# Patient Record
Sex: FEMALE | Race: WHITE
[De-identification: names, ages, dates, MRNs, and addresses within clinical notes are randomized per-mention and may not be internally consistent; named-entity substitution may affect disease eponyms.]

---

## 2019-02-09 ENCOUNTER — HOSPITAL ENCOUNTER (EMERGENCY)
Dept: HOSPITAL 75 - ER | Age: 79
Discharge: HOME | End: 2019-02-09
Payer: MEDICARE

## 2019-02-09 VITALS — WEIGHT: 143 LBS | HEIGHT: 62 IN | BODY MASS INDEX: 26.31 KG/M2

## 2019-02-09 VITALS — SYSTOLIC BLOOD PRESSURE: 161 MMHG | DIASTOLIC BLOOD PRESSURE: 78 MMHG

## 2019-02-09 DIAGNOSIS — I10: ICD-10-CM

## 2019-02-09 DIAGNOSIS — Z98.51: ICD-10-CM

## 2019-02-09 DIAGNOSIS — Z87.19: ICD-10-CM

## 2019-02-09 DIAGNOSIS — G45.9: Primary | ICD-10-CM

## 2019-02-09 LAB
ALBUMIN SERPL-MCNC: 4.2 GM/DL (ref 3.2–4.5)
ALP SERPL-CCNC: 72 U/L (ref 40–136)
ALT SERPL-CCNC: 25 U/L (ref 0–55)
APTT BLD: 26 SEC (ref 24–35)
APTT PPP: YELLOW S
BACTERIA #/AREA URNS HPF: NEGATIVE /HPF
BASOPHILS # BLD AUTO: 0 10^3/UL (ref 0–0.1)
BASOPHILS NFR BLD AUTO: 0 % (ref 0–10)
BILIRUB SERPL-MCNC: 0.3 MG/DL (ref 0.1–1)
BILIRUB UR QL STRIP: NEGATIVE
BUN/CREAT SERPL: 17
CALCIUM SERPL-MCNC: 9.5 MG/DL (ref 8.5–10.1)
CHLORIDE SERPL-SCNC: 108 MMOL/L (ref 98–107)
CO2 SERPL-SCNC: 24 MMOL/L (ref 21–32)
CREAT SERPL-MCNC: 1.02 MG/DL (ref 0.6–1.3)
D DIMER PPP FEU-MCNC: 0.79 UG/ML (ref 0–0.49)
EOSINOPHIL # BLD AUTO: 0.1 10^3/UL (ref 0–0.3)
EOSINOPHIL NFR BLD AUTO: 1 % (ref 0–10)
ERYTHROCYTE [DISTWIDTH] IN BLOOD BY AUTOMATED COUNT: 13.6 % (ref 10–14.5)
FIBRINOGEN PPP-MCNC: CLEAR MG/DL
GFR SERPLBLD BASED ON 1.73 SQ M-ARVRAT: 52 ML/MIN
GLUCOSE SERPL-MCNC: 111 MG/DL (ref 70–105)
GLUCOSE UR STRIP-MCNC: NEGATIVE MG/DL
HCT VFR BLD CALC: 43 % (ref 35–52)
HGB BLD-MCNC: 14.1 G/DL (ref 11.5–16)
INR PPP: 1 (ref 0.8–1.4)
KETONES UR QL STRIP: NEGATIVE
LEUKOCYTE ESTERASE UR QL STRIP: (no result)
LYMPHOCYTES # BLD AUTO: 1 X 10^3 (ref 1–4)
LYMPHOCYTES NFR BLD AUTO: 14 % (ref 12–44)
MANUAL DIFFERENTIAL PERFORMED BLD QL: NO
MCH RBC QN AUTO: 31 PG (ref 25–34)
MCHC RBC AUTO-ENTMCNC: 33 G/DL (ref 32–36)
MCV RBC AUTO: 93 FL (ref 80–99)
MONOCYTES # BLD AUTO: 0.6 X 10^3 (ref 0–1)
MONOCYTES NFR BLD AUTO: 9 % (ref 0–12)
NEUTROPHILS # BLD AUTO: 5.2 X 10^3 (ref 1.8–7.8)
NEUTROPHILS NFR BLD AUTO: 75 % (ref 42–75)
NITRITE UR QL STRIP: NEGATIVE
PH UR STRIP: 5 [PH] (ref 5–9)
PLATELET # BLD: 181 10^3/UL (ref 130–400)
PMV BLD AUTO: 10.7 FL (ref 7.4–10.4)
POTASSIUM SERPL-SCNC: 3.9 MMOL/L (ref 3.6–5)
PROT SERPL-MCNC: 6.4 GM/DL (ref 6.4–8.2)
PROT UR QL STRIP: NEGATIVE
PROTHROMBIN TIME: 13 SEC (ref 12.2–14.7)
RBC #/AREA URNS HPF: (no result) /HPF
SODIUM SERPL-SCNC: 143 MMOL/L (ref 135–145)
SP GR UR STRIP: 1.01 (ref 1.02–1.02)
SQUAMOUS #/AREA URNS HPF: (no result) /HPF
UROBILINOGEN UR-MCNC: NORMAL MG/DL
WBC # BLD AUTO: 7 10^3/UL (ref 4.3–11)
WBC #/AREA URNS HPF: (no result) /HPF

## 2019-02-09 PROCEDURE — 85610 PROTHROMBIN TIME: CPT

## 2019-02-09 PROCEDURE — 93041 RHYTHM ECG TRACING: CPT

## 2019-02-09 PROCEDURE — 85730 THROMBOPLASTIN TIME PARTIAL: CPT

## 2019-02-09 PROCEDURE — 80053 COMPREHEN METABOLIC PANEL: CPT

## 2019-02-09 PROCEDURE — 93005 ELECTROCARDIOGRAM TRACING: CPT

## 2019-02-09 PROCEDURE — 85025 COMPLETE CBC W/AUTO DIFF WBC: CPT

## 2019-02-09 PROCEDURE — 84484 ASSAY OF TROPONIN QUANT: CPT

## 2019-02-09 PROCEDURE — 70450 CT HEAD/BRAIN W/O DYE: CPT

## 2019-02-09 PROCEDURE — 81000 URINALYSIS NONAUTO W/SCOPE: CPT

## 2019-02-09 PROCEDURE — 71045 X-RAY EXAM CHEST 1 VIEW: CPT

## 2019-02-09 PROCEDURE — 36415 COLL VENOUS BLD VENIPUNCTURE: CPT

## 2019-02-09 PROCEDURE — 85379 FIBRIN DEGRADATION QUANT: CPT

## 2019-02-09 NOTE — DIAGNOSTIC IMAGING REPORT
PROCEDURE: CT head wo r/o stroke.



TECHNIQUE: Multiple contiguous axial images were obtained through

the brain without the use of intravenous contrast.



INDICATION: Blurred vision and right hand numbness.



COMPARISON: No prior studies are available for comparison.



FINDINGS: Ventricles and sulci are consistent with the patient's

age. Moderate periventricular hypodensity is noted consistent

with senescent change and chronic microvascular ischemia. No

sulcal effacement is identified. There is no midline shift. No

acute intra-axial or extra-axial hemorrhage is detected. Cisterns

are patent. Visualized paranasal sinuses are clear.



IMPRESSION: Chronic and senescent changes. No acute intracranial

process is detected.



Results were called to Dr. Berrios of the emergency department

prior to this dictation.



Dictated by: 



  Dictated on workstation # [id number]

## 2019-02-09 NOTE — DIAGNOSTIC IMAGING REPORT
INDICATION: Blurred vision and difficulty speaking.



TIME OF EXAM: 4:25 p.m.



COMPARISON: No prior studies are available for comparison.



FINDINGS: Heart size is normal. Lungs are clear. There is no

infiltrate or failure. No effusion or pneumothorax is seen.



IMPRESSION: No acute cardiopulmonary process is detected. 



Dictated by: 



  Dictated on workstation # [id number]

## 2019-02-09 NOTE — ED NEUROLOGICAL PROBLEM
General


Chief Complaint:  Neuro-Stroke Like Symptoms


Stated Complaint:  POSS TIA/BLURRY VISION


Nursing Triage Note:  


ARRIVED VIA AMB TO ROOM 09 WITHOUT DIFFICULTY.  STATES AROUND 1300 TODAY SHE 


STARTED HAVING BLURRED VISION, TROUBLE SPEAKING, AND NUMBESS IN RIGHT HAND.  

WAS 


SEEN BY AN EMS IN [place] AND DIRCTED TO COME TO THE ER.  PT STATES HER SYMPTOMS 

HAVE 


NOW RESOLVED AND SHE FEELS FINE.


Nursing Sepsis Screen:  No Definite Risk


Source:  patient


Exam Limitations:  no limitations





History of Present Illness


Date Seen by Provider:  Feb 9, 2019


Time Seen by Provider:  16:12


Initial Comments


To ER per private vehicle from [place] with reports of strokelike symptoms. 

This began shortly after 1 PM today when she sat down to eat lunch. She 

developed numbness in the right hand describing it as if it were asleep, 

difficulty speaking in that she knew what she wanted to say but could not get 

the words out, blurred vision. These symptoms lasted for right around an hour. 

She then presented here to the emergency room. She states that she has had TIAs 

before, does not take any anticoagulant or antiplatelet medication because she 

has a history of bleeding ulcers several years ago and taking any of those 

medications scared her. Currently, she feels completely back to normal.


Timing/Duration:  1 hour


Severity:  moderate





Allergies and Home Medications


Patient Home Medication List


Home Medication List Reviewed:  Yes





Review of Systems


Review of Systems


Constitutional:  see HPI; No chills, No fever


Eyes:  See HPI, Blurred Vision


Ears, Nose, Mouth, Throat:  no symptoms reported


Cardiovascular:  no symptoms reported


Genitourinary:  no symptoms reported


Musculoskeletal:  no symptoms reported


Skin:  no symptoms reported


Psychiatric/Neurological:  No Symptoms Reported


Endocrine:  No Symptoms Reported





Past Medical-Social-Family Hx


Patient Social History


Recent Foreign Travel:  No


Contact w/Someone Who Travel:  No


Recent Infectious Disease Expo:  No


Recent Hopitalizations:  No





Past Medical History


Surgeries:  Yes (HERNIA)


Tubal Ligation


Respiratory:  No


Cardiac:  Yes


Hypertension


Neurological:  Yes


TIA


Gastrointestinal:  No


Musculoskeletal:  No


Endocrine:  No


HEENT:  Yes


Glaucoma


Cancer:  No


Psychosocial:  No


Integumentary:  No


Blood Disorders:  No


Adverse Reaction/Blood Tranf:  No





Physical Exam


Vital Signs





Vital Signs - First Documented








 2/9/19





 15:45


 


Temp 98.0


 


Pulse 90


 


Resp 16


 


B/P (MAP) 175/86 (115)


 


Pulse Ox 99


 


O2 Delivery Room Air





Capillary Refill : Less Than 3 Seconds


Height, Weight, BMI


Height: 5'2.00"


Weight: 143lbs. oz. 64.853332ge;  BMI


Method:Stated


General Appearance:  WD/WN, no apparent distress


HEENT:  PERRL/EOMI, normal ENT inspection, TMs normal


Neck:  non-tender, full range of motion


Respiratory:  normal breath sounds, no respiratory distress, no accessory 

muscle use


Cardiovascular:  regular rate, rhythm, no murmur; No systolic murmur, No 

irregularly irregular


Gastrointestinal:  normal bowel sounds, non tender, soft


Extremities:  normal range of motion, non-tender


Neurologic/Psychiatric:  no motor/sensory deficits, alert, normal mood/affect, 

oriented x 3


Crainal Nerves:  normal hearing, normal speech, PERRL


Motor/Sensory:  no motor deficit


Skin:  normal color, warm/dry





Stroke


NIH Stroke Scale Assessment





 Level of Consciousness: 0=Alert (0), Level of Consciousness-Questions: 0=

Answers both month/age (0), LOC Commands: 0=Performs both tasks (0), Visual 

Fields: 0=No visual loss (0), Facial Movement (Facial Paresis): 0=Normal 

symmetrical mnt (0), Motor Function-Arms Right: 0=No drift (0), Motor Function-

Arms Left: 0=No drift (0), Motor Function-Legs Right: 0=No drift (0), Motor 

Function-Legs Left: 0=No drift (0), Limb Ataxia: 0=Absent (0), Sensory: 0=Normal

:no loss (0), Best Language: 0=No aphasia (0), Dysarthria: 0=Normal (0), 

Extinction & Inattention: 0=No abnormality (0), Total: 





Progress/Results/Core Measures


Results/Orders


Lab Results





Laboratory Tests








Test


 2/9/19


16:36 2/9/19


16:41 Range/Units


 


 


White Blood Count


 7.0 


 


 4.3-11.0


10^3/uL


 


Red Blood Count


 4.62 


 


 4.35-5.85


10^6/uL


 


Hemoglobin 14.1   11.5-16.0  G/DL


 


Hematocrit 43   35-52  %


 


Mean Corpuscular Volume 93   80-99  FL


 


Mean Corpuscular Hemoglobin 31   25-34  PG


 


Mean Corpuscular Hemoglobin


Concent 33 


 


 32-36  G/DL





 


Red Cell Distribution Width 13.6   10.0-14.5  %


 


Platelet Count


 181 


 


 130-400


10^3/uL


 


Mean Platelet Volume 10.7 H  7.4-10.4  FL


 


Neutrophils (%) (Auto) 75   42-75  %


 


Lymphocytes (%) (Auto) 14   12-44  %


 


Monocytes (%) (Auto) 9   0-12  %


 


Eosinophils (%) (Auto) 1   0-10  %


 


Basophils (%) (Auto) 0   0-10  %


 


Neutrophils # (Auto) 5.2   1.8-7.8  X 10^3


 


Lymphocytes # (Auto) 1.0   1.0-4.0  X 10^3


 


Monocytes # (Auto) 0.6   0.0-1.0  X 10^3


 


Eosinophils # (Auto)


 0.1 


 


 0.0-0.3


10^3/uL


 


Basophils # (Auto)


 0.0 


 


 0.0-0.1


10^3/uL


 


Prothrombin Time 13.0   12.2-14.7  SEC


 


INR Comment 1.0   0.8-1.4  


 


Activated Partial


Thromboplast Time 26 


 


 24-35  SEC





 


D-Dimer


 0.79 H


 


 0.00-0.49


UG/ML


 


Sodium Level 143   135-145  MMOL/L


 


Potassium Level 3.9   3.6-5.0  MMOL/L


 


Chloride Level 108 H    MMOL/L


 


Carbon Dioxide Level 24   21-32  MMOL/L


 


Anion Gap 11   5-14  MMOL/L


 


Blood Urea Nitrogen 17   7-18  MG/DL


 


Creatinine


 1.02 


 


 0.60-1.30


MG/DL


 


Estimat Glomerular Filtration


Rate 52 


 


  





 


BUN/Creatinine Ratio 17    


 


Glucose Level 111 H    MG/DL


 


Calcium Level 9.5   8.5-10.1  MG/DL


 


Corrected Calcium 9.3   8.5-10.1  MG/DL


 


Total Bilirubin 0.3   0.1-1.0  MG/DL


 


Aspartate Amino Transf


(AST/SGOT) 26 


 


 5-34  U/L





 


Alanine Aminotransferase


(ALT/SGPT) 25 


 


 0-55  U/L





 


Alkaline Phosphatase 72     U/L


 


Troponin I < 0.028   <0.028  NG/ML


 


Total Protein 6.4   6.4-8.2  GM/DL


 


Albumin 4.2   3.2-4.5  GM/DL


 


Urine Color  YELLOW   


 


Urine Clarity  CLEAR   


 


Urine pH  5  5-9  


 


Urine Specific Gravity  1.010 L 1.016-1.022  


 


Urine Protein  NEGATIVE  NEGATIVE  


 


Urine Glucose (UA)  NEGATIVE  NEGATIVE  


 


Urine Ketones  NEGATIVE  NEGATIVE  


 


Urine Nitrite  NEGATIVE  NEGATIVE  


 


Urine Bilirubin  NEGATIVE  NEGATIVE  


 


Urine Urobilinogen  NORMAL  NORMAL  MG/DL


 


Urine Leukocyte Esterase  1+ H NEGATIVE  


 


Urine RBC (Auto)  NEGATIVE  NEGATIVE  


 


Urine RBC  NONE   /HPF


 


Urine WBC  0-2   /HPF


 


Urine Squamous Epithelial


Cells 


 0-2 


  /HPF





 


Urine Crystals  NONE   /LPF


 


Urine Bacteria  NEGATIVE   /HPF


 


Urine Casts  NONE   /LPF


 


Urine Mucus  NEGATIVE   /LPF


 


Urine Culture Indicated  NO   








My Orders





Orders - CALLI NIETO


Cbc With Automated Diff (2/9/19 16:07)


Protime With Inr (2/9/19 16:07)


Partial Thromboplastin Time (2/9/19 16:07)


Comprehensive Metabolic Panel (2/9/19 16:07)


Fibrin Degradation Products (2/9/19 16:07)


Troponin I (2/9/19 16:07)


Ua Culture If Indicated (2/9/19 16:07)


Chest 1 View, Ap/Pa Only (2/9/19 16:07)


Ekg Tracing (2/9/19 16:07)


Nothing By Mouth (2/9/19 Dinner)


Accucheck Stat ONCE (2/9/19 16:07)


Saline Lock/Iv-Start (2/9/19 16:07)


Saline Lock/Iv-Start (2/9/19 16:07)


Vital Signs Stroke Patient Q15M (2/9/19 16:07)


Ct Head Wo-R/O Stroke (2/9/19 16:07)


O2 (2/9/19 16:07)


Intake & Output 06,14,22 (2/9/19 16:07)


Monitor-Rhythm Ecg Trace Only (2/9/19 16:07)


Dysphagia Screening Tool (2/9/19 16:07)


Post Thrombolytic Adminstratio (2/9/19 16:07)





Vital Signs/I&O











 2/9/19 2/9/19





 15:45 17:46


 


Temp 98.0 


 


Pulse 90 86


 


Resp 16 16


 


B/P (MAP) 175/86 (115) 161/78 (105)


 


Pulse Ox 99 98


 


O2 Delivery Room Air Room Air














Blood Pressure Mean:  115











Departure


Communication (Admissions)


1612-remains alert and oriented with GCS 15 and NIH of 0. No findings on my 

exam to account for her symptoms, likely this was TIA. I discussed the case 

with Dr. Berrios. Agrees with discharge to home.





Impression





 Primary Impression:  


 TIA (transient ischemic attack)


Disposition:  01 HOME, SELF-CARE


Condition:  Stable





Departure-Patient Inst.


Decision time for Depature:  17:20


Referrals:  


SELFREBEKA MD (PCP/Family)


Primary Care Physician


Patient Instructions:  Transient Ischemic Attack (DC)





Add. Discharge Instructions:  


 1. Return to ER for any concerns


2. Follow-up with your doctor next week


3. All discharge instructions reviewed with patient and/or family. Voiced 

understanding.











CALLI NIETO Feb 9, 2019 16:14

## 2019-03-16 ENCOUNTER — HOSPITAL ENCOUNTER (EMERGENCY)
Dept: HOSPITAL 75 - ER FS | Age: 79
Discharge: HOME | End: 2019-03-16
Payer: MEDICARE

## 2019-03-16 VITALS — WEIGHT: 143 LBS | HEIGHT: 62 IN | BODY MASS INDEX: 26.31 KG/M2

## 2019-03-16 VITALS — SYSTOLIC BLOOD PRESSURE: 132 MMHG | DIASTOLIC BLOOD PRESSURE: 76 MMHG

## 2019-03-16 VITALS — SYSTOLIC BLOOD PRESSURE: 142 MMHG | DIASTOLIC BLOOD PRESSURE: 62 MMHG

## 2019-03-16 DIAGNOSIS — I10: ICD-10-CM

## 2019-03-16 DIAGNOSIS — R42: Primary | ICD-10-CM

## 2019-03-16 DIAGNOSIS — Z86.73: ICD-10-CM

## 2019-03-16 DIAGNOSIS — Z98.51: ICD-10-CM

## 2019-03-16 LAB
ALBUMIN SERPL-MCNC: 3.9 GM/DL (ref 3.2–4.5)
ALP SERPL-CCNC: 66 U/L (ref 40–136)
ALT SERPL-CCNC: 22 U/L (ref 0–55)
APTT BLD: 25 SEC (ref 24–35)
APTT PPP: YELLOW S
BACTERIA #/AREA URNS HPF: (no result) /HPF
BASOPHILS # BLD AUTO: 0 10^3/UL (ref 0–0.1)
BASOPHILS NFR BLD AUTO: 1 % (ref 0–10)
BILIRUB SERPL-MCNC: 0.2 MG/DL (ref 0.1–1)
BILIRUB UR QL STRIP: NEGATIVE
BUN/CREAT SERPL: 20
CALCIUM SERPL-MCNC: 9.2 MG/DL (ref 8.5–10.1)
CHLORIDE SERPL-SCNC: 102 MMOL/L (ref 98–107)
CO2 SERPL-SCNC: 19 MMOL/L (ref 21–32)
CREAT SERPL-MCNC: 0.88 MG/DL (ref 0.6–1.3)
EOSINOPHIL # BLD AUTO: 0.1 10^3/UL (ref 0–0.3)
EOSINOPHIL NFR BLD AUTO: 2 % (ref 0–10)
ERYTHROCYTE [DISTWIDTH] IN BLOOD BY AUTOMATED COUNT: 12.9 % (ref 10–14.5)
FIBRINOGEN PPP-MCNC: CLEAR MG/DL
GFR SERPLBLD BASED ON 1.73 SQ M-ARVRAT: > 60 ML/MIN
GLUCOSE SERPL-MCNC: 99 MG/DL (ref 70–105)
GLUCOSE UR STRIP-MCNC: NEGATIVE MG/DL
HCT VFR BLD CALC: 42 % (ref 35–52)
HGB BLD-MCNC: 13.6 G/DL (ref 11.5–16)
INR PPP: 1 (ref 0.8–1.4)
KETONES UR QL STRIP: NEGATIVE
LEUKOCYTE ESTERASE UR QL STRIP: (no result)
LYMPHOCYTES # BLD AUTO: 1.5 X 10^3 (ref 1–4)
LYMPHOCYTES NFR BLD AUTO: 25 % (ref 12–44)
MANUAL DIFFERENTIAL PERFORMED BLD QL: NO
MCH RBC QN AUTO: 31 PG (ref 25–34)
MCHC RBC AUTO-ENTMCNC: 33 G/DL (ref 32–36)
MCV RBC AUTO: 94 FL (ref 80–99)
MONOCYTES # BLD AUTO: 0.6 X 10^3 (ref 0–1)
MONOCYTES NFR BLD AUTO: 9 % (ref 0–12)
NEUTROPHILS # BLD AUTO: 3.8 X 10^3 (ref 1.8–7.8)
NEUTROPHILS NFR BLD AUTO: 63 % (ref 42–75)
NITRITE UR QL STRIP: NEGATIVE
PH UR STRIP: 6.5 [PH] (ref 5–9)
PLATELET # BLD: 168 10^3/UL (ref 130–400)
PMV BLD AUTO: 11.3 FL (ref 7.4–10.4)
POTASSIUM SERPL-SCNC: 4.1 MMOL/L (ref 3.6–5)
PROT SERPL-MCNC: 6 GM/DL (ref 6.4–8.2)
PROT UR QL STRIP: NEGATIVE
PROTHROMBIN TIME: 13.1 SEC (ref 12.2–14.7)
RBC #/AREA URNS HPF: (no result) /HPF
SODIUM SERPL-SCNC: 140 MMOL/L (ref 135–145)
SP GR UR STRIP: <1.005 (ref 1.02–1.02)
SQUAMOUS #/AREA URNS HPF: (no result) /HPF
UROBILINOGEN UR-MCNC: 0.2 MG/DL
WBC # BLD AUTO: 6 10^3/UL (ref 4.3–11)
WBC #/AREA URNS HPF: (no result) /HPF

## 2019-03-16 PROCEDURE — 70450 CT HEAD/BRAIN W/O DYE: CPT

## 2019-03-16 PROCEDURE — 81000 URINALYSIS NONAUTO W/SCOPE: CPT

## 2019-03-16 PROCEDURE — 85730 THROMBOPLASTIN TIME PARTIAL: CPT

## 2019-03-16 PROCEDURE — 84484 ASSAY OF TROPONIN QUANT: CPT

## 2019-03-16 PROCEDURE — 85025 COMPLETE CBC W/AUTO DIFF WBC: CPT

## 2019-03-16 PROCEDURE — 85610 PROTHROMBIN TIME: CPT

## 2019-03-16 PROCEDURE — 93005 ELECTROCARDIOGRAM TRACING: CPT

## 2019-03-16 PROCEDURE — 36415 COLL VENOUS BLD VENIPUNCTURE: CPT

## 2019-03-16 PROCEDURE — 80053 COMPREHEN METABOLIC PANEL: CPT

## 2019-03-16 NOTE — DIAGNOSTIC IMAGING REPORT
PROCEDURE: CT head without contrast.



TECHNIQUE: Multiple contiguous axial images were obtained through

the brain without the use of intravenous contrast.



INDICATION: Dizziness.



FINDINGS: There is no mass, shift of the midline or hemorrhage to

suggest an acute intracranial abnormality. The normal tentorial

blush is noted. The ventricles are not abnormally dilated and

stable in size when compared to the prior exam of 2/9/2019. The

cortical atrophy and periventricular encephalomalacia, seen

previously, are again evident and no different. The bone windows

show no sign of a fracture or of a destructive lesion. The orbits

and sinuses were not visualized in their entirety. Where

visualized, there is no acute abnormality. 



IMPRESSION:

1. There is no evidence for an acute intracranial abnormality.

When compared with the prior study, there has been no significant

change.

2. If clinical concern regarding an underlying abnormality

persists, then MRI would be recommended for further study.  



Dictated by: 



  Dictated on workstation # [id number]

## 2019-03-16 NOTE — ED GENERAL
General


Stated Complaint:  DIZZY


Source of Information:  Patient, Spouse


 (WILDA PARKINSON MD)





History of Present Illness


Date Seen by Provider:  Mar 16, 2019


Time Seen by Provider:  16:33


Initial Comments


78-year-old female with history of TIAs presents with onset of dizziness 

approximately 30 minutes prior to arrival. By the time she was checked in, her 

symptoms had resolved. She describes it as a generalized dizziness with some 

visual blurring. No associated headache, paresthesias, weakness, incoordination

, ataxia, dysarthria or other associated neurologic symptoms. She has a history 

of inner ear problems as well. She is not on any blood thinners and has had no 

recent head injury. She denies fever or chills. No respiratory, chest pain, or 

other associated symptoms.


Timing/Duration:  1/2 Hour


Severity:  Moderate


Associated Systoms:  No Chest Pain, No Fever/Chills, No Headaches, No Nausea/

Vomiting, No Seizure, No Syncope, No Weakness (WILDA PARKINSON MD)





Allergies and Home Medications


Patient Home Medication List


Home Medication List Reviewed:  Yes


 (SYLVIA MONTERO DO)





Review of Systems


Review of Systems


EENTM:  No blurred vision, No double vision, No throat pain


Respiratory:  No short of breath, No stridor


Cardiovascular:  No edema, No palpitations, No syncope


Gastrointestinal:  no symptoms reported; No diarrhea, No nausea; other (

occasional dyspepsia)


Genitourinary:  no symptoms reported; No hematuria, No pain


Pregnant:  No


Musculoskeletal:  no symptoms reported


Skin:  no symptoms reported


Psychiatric/Neurological:  See HPI; Denies Paresthesia, Denies Seizure, Denies 

Tingling


Hematologic/Lymphatic:  See HPI; Denies Anemia, Denies Blood Clots, Denies Other


Immunological/Allergic:  no symptoms reported; denies transplant (WILDA PARKINSON MD

)


Constitutional:  no symptoms reported (SYLVIA MONTERO DO)





Past Medical-Social-Family Hx


Past Med/Social Hx:  Reviewed Nursing Past Med/Soc Hx


 (WILDA PARKINSON MD)





Patient Social History


Recent Foreign Travel:  No (N)


Contact w/Someone Who Travel:  No (N)


Recent Hopitalizations:  No


 (WILDA PARKINSON MD)





Past Medical History


Surgeries:  Yes (HERNIA)


Tubal Ligation


Respiratory:  No


Cardiac:  Yes


Hypertension


Neurological:  Yes


TIA


Gastrointestinal:  No


Musculoskeletal:  No


Endocrine:  No


HEENT:  Yes


Glaucoma


Cancer:  No


Psychosocial:  No


Integumentary:  No


Blood Disorders:  No


Adverse Reaction/Blood Tranf:  No


 (WILDA PARKINSON MD)





Physical Exam


Vital Signs





Vital Signs - First Documented








 3/16/19





 16:44


 


Temp 97.9


 


Pulse 72


 


Resp 18


 


B/P (MAP) 174/72 (106)


 


Pulse Ox 99


 


O2 Delivery Room Air





 (SYLVIA MONTERO DO)


Vital Signs


Capillary Refill :  


 (WILDA PARKINSON MD)


Height, Weight, BMI


Height: 5'2.00"


Weight: 143lbs. oz. 64.925746cb;  BMI


Method:Stated


General Appearance:  No Apparent Distress, WD/WN, Anxious


Eyes:  Bilateral Eye Normal Inspection, Bilateral Eye PERRL, Bilateral Eye EOMI


HEENT:  PERRL/EOMI, TMs Normal, Normal ENT Inspection, Pharynx Normal


Neck:  Full Range of Motion, Normal Inspection, Non Tender, Supple; No Carotid 

Bruit


Respiratory:  Chest Non Tender, Lungs Clear, Normal Breath Sounds, No Accessory 

Muscle Use, No Respiratory Distress


Cardiovascular:  Regular Rate, Rhythm, No Edema, No Gallop, No JVD, No Murmur, 

Normal Peripheral Pulses


Gastrointestinal:  Normal Bowel Sounds, No Organomegaly, No Pulsatile Mass, Non 

Tender, Soft


Back:  Normal Inspection, No CVA Tenderness, No Vertebral Tenderness


Extremity:  Normal Capillary Refill, No Calf Tenderness, No Pedal Edema


Neurologic/Psychiatric:  Alert, Oriented x3, No Motor/Sensory Deficits, Normal 

Mood/Affect


Reflexes:  2+ Bicep (R), 2+ Bicep (L)


Skin:  Normal Color, Warm/Dry


Comments


NIHSS 0


 (WILDA PARKINSON MD)





Progress/Results/Core Measures


Suspected Sepsis


SIRS


Temperature: 


Pulse:  


Respiratory Rate: 


 


Laboratory Tests


3/16/19 16:57: White Blood Count 6.0


Blood Pressure  / 


Mean: 


 





Laboratory Tests


3/16/19 16:57: 


Creatinine 0.88, INR Comment 1.0, Platelet Count 168, Total Bilirubin 0.2


 (WILDA PARKINSON MD)





Results/Orders


Lab Results





Laboratory Tests








Test


 3/16/19


16:57 3/16/19


18:27 Range/Units


 


 


White Blood Count


 6.0 


 


 4.3-11.0


10^3/uL


 


Red Blood Count


 4.45 


 


 4.35-5.85


10^6/uL


 


Hemoglobin 13.6   11.5-16.0  G/DL


 


Hematocrit 42   35-52  %


 


Mean Corpuscular Volume 94   80-99  FL


 


Mean Corpuscular Hemoglobin 31   25-34  PG


 


Mean Corpuscular Hemoglobin


Concent 33 


 


 32-36  G/DL





 


Red Cell Distribution Width 12.9   10.0-14.5  %


 


Platelet Count


 168 


 


 130-400


10^3/uL


 


Mean Platelet Volume 11.3 H  7.4-10.4  FL


 


Neutrophils (%) (Auto) 63   42-75  %


 


Lymphocytes (%) (Auto) 25   12-44  %


 


Monocytes (%) (Auto) 9   0-12  %


 


Eosinophils (%) (Auto) 2   0-10  %


 


Basophils (%) (Auto) 1   0-10  %


 


Neutrophils # (Auto) 3.8   1.8-7.8  X 10^3


 


Lymphocytes # (Auto) 1.5   1.0-4.0  X 10^3


 


Monocytes # (Auto) 0.6   0.0-1.0  X 10^3


 


Eosinophils # (Auto)


 0.1 


 


 0.0-0.3


10^3/uL


 


Basophils # (Auto)


 0.0 


 


 0.0-0.1


10^3/uL


 


Prothrombin Time 13.1   12.2-14.7  SEC


 


INR Comment 1.0   0.8-1.4  


 


Activated Partial


Thromboplast Time 25 


 


 24-35  SEC





 


Sodium Level 140   135-145  MMOL/L


 


Potassium Level 4.1   3.6-5.0  MMOL/L


 


Chloride Level 102     MMOL/L


 


Carbon Dioxide Level 19 L  21-32  MMOL/L


 


Anion Gap 19 H  5-14  MMOL/L


 


Blood Urea Nitrogen 18   7-18  MG/DL


 


Creatinine


 0.88 


 


 0.60-1.30


MG/DL


 


Estimat Glomerular Filtration


Rate > 60 


 


  





 


BUN/Creatinine Ratio 20    


 


Glucose Level 99     MG/DL


 


Calcium Level 9.2   8.5-10.1  MG/DL


 


Corrected Calcium 9.3   8.5-10.1  MG/DL


 


Total Bilirubin 0.2   0.1-1.0  MG/DL


 


Aspartate Amino Transf


(AST/SGOT) 19 


 


 5-34  U/L





 


Alanine Aminotransferase


(ALT/SGPT) 22 


 


 0-55  U/L





 


Alkaline Phosphatase 66     U/L


 


Troponin T 12 H  <=10  NG/L


 


Total Protein 6.0 L  6.4-8.2  GM/DL


 


Albumin 3.9   3.2-4.5  GM/DL


 


Urine Color  YELLOW   


 


Urine Clarity  CLEAR   


 


Urine pH  6.5  5-9  


 


Urine Specific Gravity  <1.005  1.016-1.022  


 


Urine Protein  NEGATIVE  NEGATIVE  


 


Urine Glucose (UA)  NEGATIVE  NEGATIVE  


 


Urine Ketones  NEGATIVE  NEGATIVE  


 


Urine Nitrite  NEGATIVE  NEGATIVE  


 


Urine Bilirubin  NEGATIVE  NEGATIVE  


 


Urine Urobilinogen  0.2  NORMAL  MG/DL


 


Urine Leukocyte Esterase  TRACE  NEGATIVE  


 


Urine RBC (Auto)  TRACE H NEGATIVE  


 


Urine RBC  NONE   /HPF


 


Urine WBC  2-5   /HPF


 


Urine Squamous Epithelial


Cells 


 0-2 


  /HPF





 


Urine Crystals  NONE   /LPF


 


Urine Bacteria  NONE   /HPF


 


Urine Casts  NONE   /LPF


 


Urine Mucus  NEGATIVE   /LPF


 


Urine Culture Indicated  NO   





 (SYLVIA MONTERO DO)


Vital Signs/I&O











 3/16/19 3/16/19 3/16/19





 16:44 17:56 18:47


 


Temp 97.9  97.3


 


Pulse 72 78 76


 


Resp 18 16 16


 


B/P (MAP) 174/72 (106) 142/62 (88) 132/76 (94)


 


Pulse Ox 99 98 99


 


O2 Delivery Room Air  





 (SYLVIA MONTERO DO)


Vital Signs/I&O


Capillary Refill :  


 (WILDA PARKINSON MD)


Progress Note :  


   Time:  17:40


Progress Note


No further dizziness. Monitor shows sinus rhythm. Blood pressure much better. 

We will await results of CT. She is comfortable going home at these tests are 

negative. She understands the risk of undiscovered condition.


 (WILDA PARKINSON MD)


Progress Note :  


Progress Note


Feilciano addendum: Workup is negative. I reevaluated the patient. She had a 

vertiginous component to her symptoms but also an orthostatic component. She 

has had evaluation for possible Mnire's disease in the past. At no time did 

she have chest pain, palpitations, shortness of breath, black or bloody stools, 

diplopia, dysphagia, dysarthria. She is completely well-appearing at this time, 

at the time of my repeat evaluation she is sitting up on the side of the bed 

and would like to go home. She will follow up with her doctor and will return 

for any new or worsening symptoms.


 (SYLVIA MONTERO DO)


ECG


Initial ECG Impression Date:  Mar 16, 2019


Initial ECG Impression Time:  17:55


Initial ECG Rhythm:  Normal Sinus, PAC


Initial ECG Intervals:  Normal


Initial ECG Impression:  Nonspecific Changes


Comment


No acute changes


 (WILDA PARKINSON MD)





Departure


Impression





 Primary Impression:  


 Dizziness


 Additional Impression:  


 Hypertension


 Qualified Codes:  I10 - Essential (primary) hypertension


Disposition:  01 HOME, SELF-CARE


Condition:  Stable





Departure-Patient Inst.


Referrals:  


REBEKA CEDILLO MD (PCP/Family)


Primary Care Physician


Patient Instructions:  Syncope (Fainting) (DC)











WILDA PARKINSON MD Mar 16, 2019 16:33


SYLVIA MONTERO DO Mar 16, 2019 18:44

## 2019-06-26 ENCOUNTER — HOSPITAL ENCOUNTER (EMERGENCY)
Dept: HOSPITAL 75 - ER FS | Age: 79
Discharge: HOME | End: 2019-06-26
Payer: MEDICARE

## 2019-06-26 VITALS — BODY MASS INDEX: 26.13 KG/M2 | HEIGHT: 62 IN | WEIGHT: 142 LBS

## 2019-06-26 VITALS — DIASTOLIC BLOOD PRESSURE: 73 MMHG | SYSTOLIC BLOOD PRESSURE: 150 MMHG

## 2019-06-26 DIAGNOSIS — Z86.73: ICD-10-CM
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
## 2019-06-26 DIAGNOSIS — Z88.0: ICD-10-CM

## 2019-06-26 DIAGNOSIS — I10: ICD-10-CM

## 2019-06-26 DIAGNOSIS — L03.031: ICD-10-CM

## 2019-06-26 DIAGNOSIS — T31.0: ICD-10-CM

## 2019-06-26 DIAGNOSIS — Z98.890: ICD-10-CM

## 2019-06-26 DIAGNOSIS — Z98.51: ICD-10-CM

## 2019-06-26 DIAGNOSIS — T25.231A: Primary | ICD-10-CM

## 2019-06-26 DIAGNOSIS — Z88.5: ICD-10-CM

## 2019-06-26 DIAGNOSIS — Z88.8: ICD-10-CM

## 2019-06-26 PROCEDURE — 99282 EMERGENCY DEPT VISIT SF MDM: CPT
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
## 2020-01-16 ENCOUNTER — HOSPITAL ENCOUNTER (OUTPATIENT)
Dept: HOSPITAL 75 - ER FS | Age: 80
Setting detail: OBSERVATION
LOS: 1 days | Discharge: HOME | End: 2020-01-17
Attending: INTERNAL MEDICINE | Admitting: INTERNAL MEDICINE
Payer: MEDICARE

## 2020-01-16 VITALS — DIASTOLIC BLOOD PRESSURE: 81 MMHG | SYSTOLIC BLOOD PRESSURE: 145 MMHG

## 2020-01-16 VITALS — SYSTOLIC BLOOD PRESSURE: 172 MMHG | DIASTOLIC BLOOD PRESSURE: 65 MMHG

## 2020-01-16 VITALS — DIASTOLIC BLOOD PRESSURE: 75 MMHG | SYSTOLIC BLOOD PRESSURE: 169 MMHG

## 2020-01-16 VITALS — DIASTOLIC BLOOD PRESSURE: 65 MMHG | SYSTOLIC BLOOD PRESSURE: 172 MMHG

## 2020-01-16 VITALS — WEIGHT: 154.1 LBS | BODY MASS INDEX: 28 KG/M2 | HEIGHT: 62.01 IN

## 2020-01-16 DIAGNOSIS — Z79.899: ICD-10-CM

## 2020-01-16 DIAGNOSIS — H40.9: ICD-10-CM

## 2020-01-16 DIAGNOSIS — R55: ICD-10-CM

## 2020-01-16 DIAGNOSIS — I07.1: ICD-10-CM

## 2020-01-16 DIAGNOSIS — R42: ICD-10-CM

## 2020-01-16 DIAGNOSIS — G45.9: Primary | ICD-10-CM

## 2020-01-16 DIAGNOSIS — E78.5: ICD-10-CM

## 2020-01-16 DIAGNOSIS — I10: ICD-10-CM

## 2020-01-16 LAB
ALBUMIN SERPL-MCNC: 4.2 GM/DL (ref 3.2–4.5)
ALP SERPL-CCNC: 68 U/L (ref 40–136)
ALT SERPL-CCNC: 18 U/L (ref 0–55)
APTT BLD: 25 SEC (ref 24–35)
APTT PPP: YELLOW S
BACTERIA #/AREA URNS HPF: NEGATIVE /HPF
BASOPHILS # BLD AUTO: 0.1 10^3/UL (ref 0–0.1)
BASOPHILS NFR BLD AUTO: 1 % (ref 0–10)
BASOPHILS NFR BLD MANUAL: 1 %
BILIRUB SERPL-MCNC: 0.4 MG/DL (ref 0.1–1)
BILIRUB UR QL STRIP: NEGATIVE
BUN/CREAT SERPL: 18
CALCIUM SERPL-MCNC: 9.4 MG/DL (ref 8.5–10.1)
CHLORIDE SERPL-SCNC: 104 MMOL/L (ref 98–107)
CO2 SERPL-SCNC: 25 MMOL/L (ref 21–32)
CREAT SERPL-MCNC: 0.89 MG/DL (ref 0.6–1.3)
EOSINOPHIL # BLD AUTO: 0.2 10^3/UL (ref 0–0.3)
EOSINOPHIL NFR BLD AUTO: 4 % (ref 0–10)
EOSINOPHIL NFR BLD MANUAL: 2 %
ERYTHROCYTE [DISTWIDTH] IN BLOOD BY AUTOMATED COUNT: 12.8 % (ref 10–14.5)
FIBRINOGEN PPP-MCNC: (no result) MG/DL
GFR SERPLBLD BASED ON 1.73 SQ M-ARVRAT: > 60 ML/MIN
GLUCOSE SERPL-MCNC: 104 MG/DL (ref 70–105)
GLUCOSE UR STRIP-MCNC: NEGATIVE MG/DL
HCT VFR BLD CALC: 44 % (ref 35–52)
HGB BLD-MCNC: 14.5 G/DL (ref 11.5–16)
INR PPP: 1 (ref 0.8–1.4)
KETONES UR QL STRIP: NEGATIVE
LEUKOCYTE ESTERASE UR QL STRIP: (no result)
LYMPHOCYTES # BLD AUTO: 1.3 X 10^3 (ref 1–4)
LYMPHOCYTES NFR BLD AUTO: 24 % (ref 12–44)
MCH RBC QN AUTO: 31 PG (ref 25–34)
MCHC RBC AUTO-ENTMCNC: 33 G/DL (ref 32–36)
MCV RBC AUTO: 93 FL (ref 80–99)
MONOCYTES # BLD AUTO: 0.4 X 10^3 (ref 0–1)
MONOCYTES NFR BLD AUTO: 8 % (ref 0–12)
MONOCYTES NFR BLD: 9 %
NEUTROPHILS # BLD AUTO: 3.5 X 10^3 (ref 1.8–7.8)
NEUTROPHILS NFR BLD AUTO: 63 % (ref 42–75)
NEUTS BAND NFR BLD MANUAL: 60 %
NEUTS BAND NFR BLD: 2 %
NITRITE UR QL STRIP: NEGATIVE
PH UR STRIP: 7 [PH] (ref 5–9)
PLATELET # BLD: 199 10^3/UL (ref 130–400)
PMV BLD AUTO: 10.5 FL (ref 7.4–10.4)
POTASSIUM SERPL-SCNC: 4 MMOL/L (ref 3.6–5)
PROT SERPL-MCNC: 6.2 GM/DL (ref 6.4–8.2)
PROT UR QL STRIP: NEGATIVE
PROTHROMBIN TIME: 13.3 SEC (ref 12.2–14.7)
RBC #/AREA URNS HPF: (no result) /HPF
SODIUM SERPL-SCNC: 140 MMOL/L (ref 135–145)
SP GR UR STRIP: 1.01 (ref 1.02–1.02)
SQUAMOUS #/AREA URNS HPF: (no result) /HPF
VARIANT LYMPHS NFR BLD MANUAL: 26 %
WBC # BLD AUTO: 5.6 10^3/UL (ref 4.3–11)
WBC #/AREA URNS HPF: (no result) /HPF

## 2020-01-16 PROCEDURE — 70551 MRI BRAIN STEM W/O DYE: CPT

## 2020-01-16 PROCEDURE — 93005 ELECTROCARDIOGRAM TRACING: CPT

## 2020-01-16 PROCEDURE — 80061 LIPID PANEL: CPT

## 2020-01-16 PROCEDURE — 85007 BL SMEAR W/DIFF WBC COUNT: CPT

## 2020-01-16 PROCEDURE — 80053 COMPREHEN METABOLIC PANEL: CPT

## 2020-01-16 PROCEDURE — 81000 URINALYSIS NONAUTO W/SCOPE: CPT

## 2020-01-16 PROCEDURE — 93880 EXTRACRANIAL BILAT STUDY: CPT

## 2020-01-16 PROCEDURE — 85027 COMPLETE CBC AUTOMATED: CPT

## 2020-01-16 PROCEDURE — 85730 THROMBOPLASTIN TIME PARTIAL: CPT

## 2020-01-16 PROCEDURE — 85610 PROTHROMBIN TIME: CPT

## 2020-01-16 PROCEDURE — 70450 CT HEAD/BRAIN W/O DYE: CPT

## 2020-01-16 PROCEDURE — 87088 URINE BACTERIA CULTURE: CPT

## 2020-01-16 PROCEDURE — 36415 COLL VENOUS BLD VENIPUNCTURE: CPT

## 2020-01-16 PROCEDURE — 85025 COMPLETE CBC W/AUTO DIFF WBC: CPT

## 2020-01-16 PROCEDURE — 93306 TTE W/DOPPLER COMPLETE: CPT

## 2020-01-16 RX ADMIN — DOCUSATE SODIUM AND SENNOSIDES SCH EA: 8.6; 5 TABLET, FILM COATED ORAL at 21:00

## 2020-01-16 RX ADMIN — ACETAMINOPHEN PRN MG: 500 TABLET ORAL at 19:58

## 2020-01-16 RX ADMIN — METOPROLOL TARTRATE SCH MG: 25 TABLET, FILM COATED ORAL at 19:58

## 2020-01-16 RX ADMIN — ACETAMINOPHEN PRN MG: 500 TABLET ORAL at 15:50

## 2020-01-16 NOTE — NUR
SPOKE WITH THE PATIENT ABOUT HER MEDICATIONS. SHE LISTED WHAT SHE IS TAKING. I COMPARED THE 
PRESCRIPTIONS WITH THE EXT MED HX. 



OTC MEDS:

MTV DAILY

VITAMIN C DAILY

VITAMIN D DAILY

MECLIZINE PRN DIZZINESS

## 2020-01-16 NOTE — CONSULTATION-CARDIOLOGY
HPI-Cardiology


Cardiology Consultation:


Date of Consultation


20


Date of Admission





Attending Physician


Danielle Vela DO


Admitting Physician


Nahid Davila MD


Consulting Physician


CONG BLANDON MD





HPI:


Time Seen by a Provider:  16:48


Chief Complaint:


TIA


This is a 79-year-old lady who has history of hypertension.  She presents with 

focal neurological symptoms.  She was diagnosed with TIA/CVA.  Cardiology was 

consulted to rule out possible cardiac etiology of embolic stroke.  When I saw 

the patient she was not having any further neurological residual symptoms.  She 

denies any cardiac symptoms including palpitation, shortness of breath, chest 

pain.





Review of Systems-Cardiology


Review of Systems


Constitutional:  As described under HPI; No As described under HPI, No no 

symptoms reported, No chills, No fever, No lightheadedness


Eyes:  No As described under HPI, No no symptoms reported, No blindness, No 

blurred vision, No contact lenses, No drainage, No decreased acuity, No foreign 

body sensation, No pain, No vision change


Ears/Nose/Throat:  No As described under HPI, No no symptoms reported, No 

chronic hearing loss, No ear discharge, No ear pain, No nasal drainage, No 

ulcerations


Respiratory:  No no symptoms reported; As described under HPI; No As described 

under HPI, No cough, No orthopnea, No shortness of breath, No SOB with excertion


Cardiovascular:  No no symptoms reported; As described under HPI; No As 

described under HPI, No chest pain, No edema, No irregular heart rate, No 

lightheadedness, No palpitations


Gastrointestinal:  No no symptoms reported, No As described under HPI, No 

abdomen distended, No abdominal pain, No blood streaked bowels, No constipation,

No diarrhea, No nausea, No vomiting, No stool coloration changes


Genitourinary:  No As described under HPI, No burning, No dysuria, No discharge,

No frequency, No flank pain, No hematuria, No urgency


Pregnant:  Yes


Pregnant:  No


Skin:  No rash, No skin related problems, No ulcerations


Psychiatric/Neurological:  No anxiety, No depression, No seizure, No focal 

weakness, No syncope


Hematologic:  No bleeding abnormalities





PMH-Social-Family Hx


Patient Social History


Alcohol Use:  Denies Use


Recreational Drug Use:  No


2nd Hand Smoke Exposure:  No


Recent Foreign Travel:  No


Recent Infectious Disease Expo:  No


Hospitalization with Isolation:  Denies


Physical Abuse Screen:  No


Sexual Abuse:  No





Immunizations Up To Date


Date of Pneumonia Vaccine:  Oct 1, 2018





Past Medical History


PMH


As described under Assessment.





Allergies and Home Medications


Allergies


Coded Allergies:  


     Penicillins (Unverified  Allergy, Unknown, 20)


     Statins-Hmg-Coa Reductase Inhibitor (Verified  Allergy, Unknown, 20)


   


   per patient report


     diphenhydramine (Verified  Allergy, Unknown, 19)





Home Medications


Ascorbate Calcium 500 Mg Tablet, 500 MG PO DAILY, (Reported)


Aspirin 81 Mg Tablet.dr, 81 MG PO DAILY


   Prescribed by: DANIELLE VELA on 20 1142


Cholecalciferol (Vitamin D3) 2,000 Unit Tablet, 2,000 UNIT PO DAILY, (Reported)


Latanoprost 2.5 Ml Drops, 1 DROP OU HS, (Reported)


Meclizine HCl 25 Mg Tablet, 25 MG PO TID PRN for DIZZINESS, (Reported)


Metoprolol Tartrate 25 Mg Tablet, 25 MG PO BID, (Reported)


Multivitamin 1 Each Tablet, 1 TAB PO DAILY, (Reported)





Patient Home Medication List


Home Medication List Reviewed:  Yes





Physical Exam-Cardiology


Physical Exam


Vital Signs/I&O


Capillary Refill : Less Than 3 Seconds


Constitutional:  appears stated age; No apparent distress; well-developed, 

well-nourished


HEENT:  PERRL; No discharge; hearing is well preserved, oral hygience is good; 

No ulceration, No xanthelasmas are seen


Neck:  No carotid bruit; carotid pulses are 2 + bilaterally


Respiratory:  chest is bilaterally symmetric, lungs clear to auscultation


Cardiovascular:  regular rate-rhythm, S1 and S2


Gastrointestinal:  soft, audible bowel sounds; No spleenomegaly


Rectal:  deferred


Extremities:  normal range of motion, non-tender, normal inspection; No 

clubbing, No cyanosis; no lower extremity edema bilateral; No significant edema


Neurologic/Psychiatric:  no motor/sensory deficits, alert, normal mood/affect, 

oriented x 3, power is 5/5 both on sides


Skin:  normal color, warm/dry; No rash, No ulcerations





Data Review


Labs





Microbiology


20 Urine Culture - Final, Complete


          Gram Pos Mixed Bacterial Danii


          Strep, Beta Hemolytic Group B


          See Comments








ECG Impression


ECG


Initial ECG Rhythm:  Normal Sinus





A/P-Cardiology


Assessment/Admission Diagnosis


TIA/CVA,


Hypertension





Plan


Evaluation of TIA with carotid study, telemetry for 48 hours, echocardiogram 

with bubble study to rule out intracardiac shunting.  If all of the above is 

negative, this will be considered as cryptogenic stroke and long-term 

surveillance for atrial fibrillation with be recommended.  I discussed at length

with the patient and she understands.  Continue aspirin.





Hypertension: Continue metoprolol.








Thank you for your consultation. Please call me if you have any questions.








RADHA Blandon MD, FACP, FACC, FSCAI, FHRS, CCDS


Interventional Cardiology


Cardiac Electrophysiology


Vascular Medicine and Endovascular Interventions





Clinical Quality Measures


DVT/VTE Risk/Contraindication:


Risk Factor Score Per Nursin


RFS Level Per Nursing on Admit:  2=Moderate











CONG BLANDON MD             2020 16:48

## 2020-01-16 NOTE — NUR
ADALBERTO LUNA admitted to room 416-1, with an admitting diagnosis of TIA, on 01/16/20 from 
 ED, accompanied by EMS. ADALBERTO LUNA introduced to surroundings, call light, bed 
controls, phone, TV, temperature control, lights, meal times, smoking policy, visitor 
policy, side rail policy, bathrooms and showers.  Patient Rights given to patient in the 
handbook. ADALBERTO LUNA verbalizes understanding that [facility] is not responsible for the 
loss or damage to any personal effects or valuables that are kept in the patients posession 
during their hospitalization.  ADALBERTO LUNA verbalizes understanding of Interdisciplinary 
Patient Education. Patient was informed about the Rapid Response Team and its purpose.

## 2020-01-16 NOTE — ED NEUROLOGICAL PROBLEM
General


Chief Complaint:  Neurological Problems


Stated Complaint:  ALTERED VISION


Nursing Triage Note:  


PT REPORTS BILATERALLY VISION CHANGES AND DIFFICULTY THINKING AND COMING  UP 


WOTH THE RIGHT WORDS THIS AM.  NO OTHER DEFICITS NOTED.


Nursing Sepsis Screen:  No Definite Risk





History of Present Illness


Date Seen by Provider:  Jan 16, 2020


Time Seen by Provider:  09:50


Initial Comments


Patient's had breakfast and then began noticing changes in her vision somewhat 

darker and then began having problems with word finding and some concentration 

issues. Has had a history of TIAs before is on sporadic aspirin usage over 

doesn't take anymore anticoagulants last one of these would've been about a year

ago minimal headache no other potential issues she's had no weakness in either 

extremity her NIH would be literally 1


Timing/Duration:  1-3 hours


Associated Symptoms:  No fever/chills, No loss of consciousness, No 

nausea/vomiting, No paresthesia, No seizures, No trouble walking, No weakness





Allergies and Home Medications


Allergies


Coded Allergies:  


     diphenhydramine (Verified  Allergy, Unknown, 6/26/19)


Uncoded Allergies:  


     PENICILLIN (Allergy, Unknown, 6/26/19)





Home Medications


Cephalexin 500 Mg Capsule, 500 MG PO TID


   Prescribed by: YAHIR OLIVER on 6/26/19 1342





Patient Home Medication List


Home Medication List Reviewed:  Yes





Review of Systems


Review of Systems


Constitutional:  No chills, No dizziness, No fever


Eyes:  Denies Blurred Vision, Denies Vision Changes


Ears, Nose, Mouth, Throat:  denies ear pain, denies throat pain


Respiratory:  No cough, No wheezing


Cardiovascular:  No edema


Gastrointestinal:  No abdominal pain, No nausea, No vomiting


Genitourinary:  No dysuria, No frequency


Musculoskeletal:  No muscle pain, No muscle weakness


Skin:  No lesions, No rash


Psychiatric/Neurological:  Denies Headache, Denies Numbness, Denies Tingling





Past Medical-Social-Family Hx


Past Med/Social Hx:  Reviewed Nursing Past Med/Soc Hx


Patient Social History


Alcohol Use:  Denies Use


Recreational Drug Use:  No


2nd Hand Smoke Exposure:  No


Recent Foreign Travel:  No


Contact w/Someone Who Travel:  No


Recent Infectious Disease Expo:  No


Recent Hopitalizations:  No


Physical Abuse:  No


Sexual Abuse:  No


Mistreated:  No


Fear:  No





Immunizations Up To Date


Date of Pneumonia Vaccine:  Oct 1, 2018





Seasonal Allergies


Seasonal Allergies:  No





Past Medical History


Surgeries:  Yes (HERNIA)


Tubal Ligation


Respiratory:  No


Cardiac:  Yes


Hypertension


Neurological:  Yes


TIA


GYN History:  Tubal Ligation


Genitourinary:  No


Gastrointestinal:  No


Musculoskeletal:  No


Endocrine:  No


HEENT:  No


Glaucoma


Cancer:  No


Psychosocial:  No


Integumentary:  No


Blood Disorders:  No


Adverse Reaction/Blood Tranf:  No





Physical Exam


Vital Signs





Vital Signs - First Documented








 1/16/20





 09:46


 


Temp 36.2


 


Pulse 82


 


Resp 18


 


B/P (MAP) 179/81 (113)


 


Pulse Ox 98


 


O2 Delivery Room Air





Capillary Refill : Less Than 3 Seconds


Height, Weight, BMI


Height: 5'2.00"


Weight: 142lbs. oz. 64.340691ix; 27.00 BMI


Method:Stated


General Appearance:  WD/WN, no apparent distress


HEENT:  PERRL/EOMI, TMs normal, pharynx normal


Neck:  non-tender, full range of motion; No carotid bruit


Respiratory:  lungs clear, normal breath sounds


Cardiovascular:  regular rate, rhythm, no murmur


Gastrointestinal:  normal bowel sounds, non tender, soft, no organomegaly


Extremities:  normal range of motion, normal inspection


Neurologic/Psychiatric:  no motor/sensory deficits, oriented x 3, other (mild 

word finding problems)


Motor/Sensory:  no motor deficit, no sensory deficit


Skin:  normal color, warm/dry





Progress/Results/Core Measures


Results/Orders


Lab Results





Laboratory Tests








Test


 1/16/20


09:55 1/16/20


10:56 Range/Units


 


 


White Blood Count


 5.6 


 


 4.3-11.0


10^3/uL


 


Red Blood Count


 4.72 


 


 4.35-5.85


10^6/uL


 


Hemoglobin 14.5   11.5-16.0  G/DL


 


Hematocrit 44   35-52  %


 


Mean Corpuscular Volume 93   80-99  FL


 


Mean Corpuscular Hemoglobin 31   25-34  PG


 


Mean Corpuscular Hemoglobin


Concent 33 


 


 32-36  G/DL





 


Red Cell Distribution Width 12.8   10.0-14.5  %


 


Platelet Count


 199 


 


 130-400


10^3/uL


 


Mean Platelet Volume 10.5 H  7.4-10.4  FL


 


Neutrophils (%) (Auto) 63   42-75  %


 


Lymphocytes (%) (Auto) 24   12-44  %


 


Monocytes (%) (Auto) 8   0-12  %


 


Eosinophils (%) (Auto) 4   0-10  %


 


Basophils (%) (Auto) 1   0-10  %


 


Neutrophils # (Auto) 3.5   1.8-7.8  X 10^3


 


Lymphocytes # (Auto) 1.3   1.0-4.0  X 10^3


 


Monocytes # (Auto) 0.4   0.0-1.0  X 10^3


 


Eosinophils # (Auto)


 0.2 


 


 0.0-0.3


10^3/uL


 


Basophils # (Auto)


 0.1 


 


 0.0-0.1


10^3/uL


 


Neutrophils % (Manual) 60    %


 


Lymphocytes % (Manual) 26    %


 


Monocytes % (Manual) 9    %


 


Eosinophils % (Manual) 2    %


 


Basophils % (Manual) 1    %


 


Band Neutrophils 2    %


 


Prothrombin Time 13.3   12.2-14.7  SEC


 


INR Comment 1.0   0.8-1.4  


 


Activated Partial


Thromboplast Time 25 


 


 24-35  SEC





 


Sodium Level 140   135-145  MMOL/L


 


Potassium Level 4.0   3.6-5.0  MMOL/L


 


Chloride Level 104     MMOL/L


 


Carbon Dioxide Level 25   21-32  MMOL/L


 


Anion Gap 11   5-14  MMOL/L


 


Blood Urea Nitrogen 16   7-18  MG/DL


 


Creatinine


 0.89 


 


 0.60-1.30


MG/DL


 


Estimat Glomerular Filtration


Rate > 60 


 


  





 


BUN/Creatinine Ratio 18    


 


Glucose Level 104     MG/DL


 


Calcium Level 9.4   8.5-10.1  MG/DL


 


Corrected Calcium 9.2   8.5-10.1  MG/DL


 


Total Bilirubin 0.4   0.1-1.0  MG/DL


 


Aspartate Amino Transf


(AST/SGOT) 22 


 


 5-34  U/L





 


Alanine Aminotransferase


(ALT/SGPT) 18 


 


 0-55  U/L





 


Alkaline Phosphatase 68     U/L


 


Total Protein 6.2 L  6.4-8.2  GM/DL


 


Albumin 4.2   3.2-4.5  GM/DL


 


Urine Color  YELLOW   


 


Urine Clarity  SLT CLOUDY   


 


Urine pH  7.0  5-9  


 


Urine Specific Gravity  1.015 L 1.016-1.022  


 


Urine Protein  NEGATIVE  NEGATIVE  


 


Urine Glucose (UA)  NEGATIVE  NEGATIVE  


 


Urine Ketones  NEGATIVE  NEGATIVE  


 


Urine Nitrite  NEGATIVE  NEGATIVE  


 


Urine Bilirubin  NEGATIVE  NEGATIVE  


 


Urine Urobilinogen  0.2  < = 1.0  MG/DL


 


Urine Leukocyte Esterase  1+ H NEGATIVE  


 


Urine RBC (Auto)  NEGATIVE  NEGATIVE  


 


Urine RBC  NONE   /HPF


 


Urine WBC  0-2   /HPF


 


Urine Squamous Epithelial


Cells 


 0-2 


  /HPF





 


Urine Crystals  NONE   /LPF


 


Urine Bacteria  NEGATIVE   /HPF


 


Urine Casts  NONE   /LPF


 


Urine Mucus  SMALL H  /LPF


 


Urine Culture Indicated  YES   








My Orders





Orders - RODRICK BARNETT JR, MD


Cbc And Manual Diff (1/16/20 09:59)


Comprehensive Metabolic Panel (1/16/20 09:59)


Protime With Inr (1/16/20 09:59)


Partial Thromboplastin Time (1/16/20 09:59)


Ekg Tracing (1/16/20 09:59)


Ct Head Wo (1/16/20 09:59)


Ua Culture If Indicated (1/16/20 09:59)


Urine Culture (1/16/20 10:56)





Vital Signs/I&O











 1/16/20





 09:46


 


Temp 36.2


 


Pulse 82


 


Resp 18


 


B/P (MAP) 179/81 (113)


 


Pulse Ox 98


 


O2 Delivery Room Air














Blood Pressure Mean:                    113











Progress


Progress Note :  


   Time:  11:47


Progress Note


Lab and x-ray back CT negative do feel like this was a TIA she is almost 

completely resolved at this time has had the dark stools but feels like probably

she needs a workup and is well placed on some sort of leg tenderness. Discussed 

with Dr. Jara except some transfer will follow depending on how she does.


Initial ECG Impression Date:  Jan 16, 2020


Initial ECG Impression Time:  10:03


Initial ECG Rate:  61


Initial ECG Rhythm:  Normal Sinus


Initial ECG Intervals:  Normal


Initial ECG Impression:  Normal





Departure


Communication (Admissions)


Time/Spoke to Admitting Phy:  11:48


Spoke with Dr. Jara excepted in transfer for TIA





Impression





   Primary Impression:  


   Transient cerebral ischemia


   Qualified Codes:  G45.9 - Transient cerebral ischemic attack, unspecified


Disposition:  02 XFER SHT-TRM HOSP


Condition:  Stable





Admissions


Decision to Admit Reason:  Admit from ER (General)


Decision to Admit/Date:  Jan 16, 2020


Time/Decision to Admit Time:  11:52





Transfer


Transfer Reason:  Exceeds level of care


Method of Transfer:  EMS





Departure-Patient Inst.


Referrals:  


REBEKA CEDILLO MD (PCP/Family)


Primary Care Physician











RODIRCK BARNETT JR, MD        Jan 16, 2020 10:18

## 2020-01-16 NOTE — DIAGNOSTIC IMAGING REPORT
PROCEDURE: CT head without contrast.



TECHNIQUE: Multiple contiguous axial images were obtained through

the brain without the use of intravenous contrast. Auto Exposure

Controls were utilized during the CT exam to meet ALARA standards

for radiation dose reduction. 



INDICATION:  Visual disturbance and difficulty finding words.



Examination is somewhat limited by motion artifact. Ventricles

and sulci are prominent. There is low density within the deep

white matter of both hemispheres. There is no CT evidence of an

acute infarct. No hemorrhage is identified. There is

atherosclerotic calcification within the distal internal carotid

arteries, bilaterally.



IMPRESSION: Senescent findings of brain without acute abnormality

detected. MRI has greater sensitivity for identifying early

ischemia.



Dictated by: 



  Dictated on workstation # [id number]

## 2020-01-16 NOTE — NUR
Pt arrived to floor via EMS, pt A&Ox4 on RA, pt denies needs at this time.  Pt's only 
complaint is HA, rating pain at 5 out of 10.  Pt oriented to room and call light, will 
continue to monitor.

## 2020-01-16 NOTE — HISTORY & PHYSICAL-HOSPITALIST
History of Present Illness


HPI/Chief Complaint


CC: TIA





HPI: This is an 79yoWF clinic patient of Dr Davila who has a h/o dizziness and 

syncope who presented to the [facility] ER with difficulty speaking and a fall. 

She had stopped her ASA she was taking for many years due to dark stools 2 

months ago. Patient was assessed in ER to need further w/u and risk 

stratification.


Source:  patient, RN/MD


Exam Limitations:  no limitations


Date Seen


20


Time Seen by a Provider:  18:30


Attending Physician


Danielle Jara Maxwell MD


Referring Physician





Date of Admission


2020 at 11:59





Home Medications & Allergies


Home Medications


Reviewed patient Home Medication Reconciliation performed by pharmacy medication

reconciliations technician and/or nursing.


Patients Allergies have been reviewed.





Allergies





Allergies


Coded Allergies


  Penicillins (Unverified Allergy, Unknown, 20)


  Statins-Hmg-Coa Reductase Inhibitor (Verified Allergy, Unknown, 20)


    per patient report


  diphenhydramine (Verified Allergy, Unknown, 19)








Past Medical-Social-Family Hx


Past Med/Social Hx:  Reviewed Nursing Past Med/Soc Hx, Reviewed and Corrections 

made


Patient Social History


Marrital Status:  single


Employed/Student:  retired


Alcohol Use:  Denies Use


Recreational Drug Use:  No


Smoking Status:  Never a Smoker


2nd Hand Smoke Exposure:  No


Physical Abuse Screen:  No


Sexual Abuse:  No


Recent Foreign Travel:  No


Contact w/other who traveled:  No


Recent Hopitalizations:  No


Recent Infectious Disease Expo:  No





Immunizations Up To Date


Date of Pneumonia Vaccine:  Oct 1, 2018





Seasonal Allergies


Seasonal Allergies:  No





Past Medical History


Surgeries:  Tubal Ligation


Cardiac:  Hypertension


Neurological:  TIA


Tubal Ligation


HEENT:  Glaucoma


History of Blood Disorders:  No


Adverse Reaction to Blood Olivas:  No





Review of Systems


Constitutional:  see HPI, dizziness


Psychiatric/Neurological:  Tingling, Weakness





Physical Exam


Physical Exam


Vital Signs





Vital Signs - First Documented








 20





 09:46


 


Temp 36.2


 


Pulse 82


 


Resp 18


 


B/P (MAP) 179/81 (113)


 


Pulse Ox 98


 


O2 Delivery Room Air





Capillary Refill : Less Than 3 Seconds


Height, Weight, BMI


Height: 5'2.00"


Weight: 142lbs. oz. 64.121073zf; 28.17 BMI


Method:Stated


General Appearance:  No Apparent Distress


Eyes:  Right Eye Normal Inspection, Right Eye PERRL


HEENT:  PERRL/EOMI, TMs Normal, Normal ENT Inspection, Pharynx Normal, Moist 

Mucous Membranes


Neck:  Full Range of Motion, Normal Inspection, Non Tender


Respiratory:  Chest Non Tender, Lungs Clear, Normal Breath Sounds, No Accessory 

Muscle Use, No Respiratory Distress


Cardiovascular:  Regular Rate, Rhythm, No Edema, No Gallop, No JVD, No Murmur, 

Normal Peripheral Pulses


Gastrointestinal:  Normal Bowel Sounds, No Organomegaly, No Pulsatile Mass, Non 

Tender, Soft


Back:  Normal Inspection, No CVA Tenderness, No Vertebral Tenderness


Extremity:  Normal Capillary Refill, Normal Inspection, Normal Range of Motion, 

Non Tender, No Calf Tenderness, No Pedal Edema


Neurologic/Psychiatric:  Alert, Oriented x3, No Motor/Sensory Deficits, Normal 

Mood/Affect


Skin:  Normal Color, Warm/Dry


Lymphatic:  No Adenopathy





Results


Results/Procedures


Labs


Laboratory Tests


20 09:55








20 05:42








20 05:49








Patient resulted labs reviewed.





Assessment/Plan


Admission Diagnosis


Assessment:


TIA


Dizziness/Pre-syncope


HTN





Plan:


MRI


Cardiology evaluation


PT/OT


Intolerant to statin


Admission Status:  Observation





Diagnosis/Problems


Diagnosis/Problems





(1) TIA (transient ischemic attack)


Status:  Acute


(2) Hypertension


Status:  Acute


(3) Dizziness


Status:  Acute





Clinical Quality Measures


DVT/VTE Risk/Contraindication:


Risk Factor Score Per Nursin


RFS Level Per Nursing on Admit:  2=Moderate











DANIELLE JARA DO                2020 20:57

## 2020-01-17 ENCOUNTER — HOSPITAL ENCOUNTER (OUTPATIENT)
Dept: HOSPITAL 75 - CARD | Age: 80
LOS: 90 days | Discharge: HOME | End: 2020-04-16
Attending: INTERNAL MEDICINE
Payer: MEDICARE

## 2020-01-17 VITALS — DIASTOLIC BLOOD PRESSURE: 66 MMHG | SYSTOLIC BLOOD PRESSURE: 153 MMHG

## 2020-01-17 VITALS — SYSTOLIC BLOOD PRESSURE: 147 MMHG | DIASTOLIC BLOOD PRESSURE: 65 MMHG

## 2020-01-17 VITALS — DIASTOLIC BLOOD PRESSURE: 70 MMHG | SYSTOLIC BLOOD PRESSURE: 118 MMHG

## 2020-01-17 VITALS — SYSTOLIC BLOOD PRESSURE: 100 MMHG | DIASTOLIC BLOOD PRESSURE: 62 MMHG

## 2020-01-17 DIAGNOSIS — I63.9: ICD-10-CM

## 2020-01-17 DIAGNOSIS — G45.9: Primary | ICD-10-CM

## 2020-01-17 LAB
ALBUMIN SERPL-MCNC: 3.9 GM/DL (ref 3.2–4.5)
ALP SERPL-CCNC: 60 U/L (ref 40–136)
ALT SERPL-CCNC: 19 U/L (ref 0–55)
BASOPHILS # BLD AUTO: 0 10^3/UL (ref 0–0.1)
BASOPHILS NFR BLD AUTO: 1 % (ref 0–10)
BILIRUB SERPL-MCNC: 0.5 MG/DL (ref 0.1–1)
BUN/CREAT SERPL: 17
CALCIUM SERPL-MCNC: 9.2 MG/DL (ref 8.5–10.1)
CHLORIDE SERPL-SCNC: 108 MMOL/L (ref 98–107)
CHOLEST SERPL-MCNC: 223 MG/DL (ref ?–200)
CO2 SERPL-SCNC: 22 MMOL/L (ref 21–32)
CREAT SERPL-MCNC: 0.9 MG/DL (ref 0.6–1.3)
EOSINOPHIL # BLD AUTO: 0.2 10^3/UL (ref 0–0.3)
EOSINOPHIL NFR BLD AUTO: 4 % (ref 0–10)
ERYTHROCYTE [DISTWIDTH] IN BLOOD BY AUTOMATED COUNT: 13.4 % (ref 10–14.5)
GFR SERPLBLD BASED ON 1.73 SQ M-ARVRAT: 60 ML/MIN
GLUCOSE SERPL-MCNC: 84 MG/DL (ref 70–105)
HCT VFR BLD CALC: 43 % (ref 35–52)
HDLC SERPL-MCNC: 62 MG/DL (ref 40–60)
HGB BLD-MCNC: 14 G/DL (ref 11.5–16)
LYMPHOCYTES # BLD AUTO: 1.6 X 10^3 (ref 1–4)
LYMPHOCYTES NFR BLD AUTO: 30 % (ref 12–44)
MANUAL DIFFERENTIAL PERFORMED BLD QL: NO
MCH RBC QN AUTO: 31 PG (ref 25–34)
MCHC RBC AUTO-ENTMCNC: 33 G/DL (ref 32–36)
MCV RBC AUTO: 93 FL (ref 80–99)
MONOCYTES # BLD AUTO: 0.5 X 10^3 (ref 0–1)
MONOCYTES NFR BLD AUTO: 10 % (ref 0–12)
NEUTROPHILS # BLD AUTO: 2.9 X 10^3 (ref 1.8–7.8)
NEUTROPHILS NFR BLD AUTO: 55 % (ref 42–75)
PLATELET # BLD: 186 10^3/UL (ref 130–400)
PMV BLD AUTO: 11.3 FL (ref 7.4–10.4)
POTASSIUM SERPL-SCNC: 3.7 MMOL/L (ref 3.6–5)
PROT SERPL-MCNC: 6.1 GM/DL (ref 6.4–8.2)
SODIUM SERPL-SCNC: 141 MMOL/L (ref 135–145)
TRIGL SERPL-MCNC: 96 MG/DL (ref ?–150)
VLDLC SERPL CALC-MCNC: 19 MG/DL (ref 5–40)
WBC # BLD AUTO: 5.2 10^3/UL (ref 4.3–11)

## 2020-01-17 RX ADMIN — DOCUSATE SODIUM AND SENNOSIDES SCH EA: 8.6; 5 TABLET, FILM COATED ORAL at 09:04

## 2020-01-17 RX ADMIN — METOPROLOL TARTRATE SCH MG: 25 TABLET, FILM COATED ORAL at 09:04

## 2020-01-17 NOTE — NUR
IRF Evaluation 



Order received to evaluate patient for the ARU. Chart review complete and findings discussed 
with Dr. Jara - patient denied admission. This denial is due to patient ambulating (500ft, 
no AD) and transferring with independence. 



Thank you for this referral.

## 2020-01-17 NOTE — PHYSICAL THERAPY EVALUATION
PT Evaluation-General


Medical Diagnosis


Admission Date


Jan 16, 2020 at 11:59


Medical Diagnosis:  TIA


Onset Date:  Jan 16, 2020





Therapy Diagnosis


Therapy Diagnosis:  debility





Height/Weight


Height (Feet):  5


Height (Inches):  2.00


Weight (Pounds):  142





Precautions


Precautions/Isolations:  Standard Precautions





Referral


Physician:  Marek


Reason for Referral:  Evaluation/Treatment





Medical History


Pertinent Medical History:  HTN


Current History


ER secondary to bilateral vision changes and difficulty thinking


Reviewed History:  Yes





Social History


Home:  Single Level


Current Living Status:  Spouse


Entry Into Home:  Stairs With Railing


PT Steps Into Home:  3





Prior


Prior Level of Function


SCALE: Activities may be completed with or without assistive devices.





6-Indepedent-patient completes the activity by him/herself with no assistance 

from a helper.


5-Set-up or Clean-up Assistance-helper sets up or cleans up; patient completes 

activity. [place] assists only prior to or  


    following the activity.


4-Supervision or Touching Assistance-helper provides verbal cues and/or 

touching/steadying and/or contact guard assistance as patient completes 

activity. Assistance may be provided   


    throughout the activity or intermittently.


3-Partial/Moderate Assistance-helper does LESS THAN HALF the effort. [place] 

lifts, holds or supports trunk or limbs, but provides less than half the effort.


2-Substantial/Maximal Assistance-helper does MORE THAN HALF the effort. [place] 

lifts or holds trunk or limbs and provides more than half the effort.


5-Zqpfmxtgj-dfjlfs does ALL the effort. Patient does none of the effort to 

complete the activity. Or, the assistance of 2 or more helpers is required for 

the patient to complete the  


    activity.


If activity was not attempted, code reason:


7-Patient Refused.


9-Not Applicable-not attempted and the patient did not perform the activity 

before the current illness, exacerbation or injury.


10-Not Attempted due to Environmental Limitations-(lack of equipment, weather 

restraints, etc.).


88-Not Attempted due to Medical Conditions or Safety Concerns.


Bed Mobility:  6


Transfers (B,C,W/C):  6


Gait:  6


Stairs:  6


Indoor Mobility (Ambulation):  Independent


Stairs:  Independent


Prior Devices Use:  None





PT Evaluation-Current


Subjective


Patient agrees to PT.





Pain





   Numeric Pain Scale:  0-No Pain


   Location:  No Pain Reported





Objective


Patient Orientation:  Normal For Age





ROM/Strength


ROM Lower Extremities


bilateral LE WFL


Strength Lower Extremities


4/5 grossly bilateral LE





Integumentary/Posture


Integumentary


refer to nursing notes


Bowel Incontinence:  No


Bladder Incontinence:  No


Posture


WFL





Neuromuscular


(Tone, Coordination, Reflexes)


grossly intact





Sensory


Vision:  Wears Glasses


Hearing:  Functional


Hand Dominance:  Right


Sensation Right Lower Extremit:  Intact


Sensation Left Lower Extremity:  Intact





Transfers


Roll Left to Right (QC):  6


Sit to Lying (QC):  6


Lying to Sitting/Side of Bed(Q:  6


Sit to Stand (QC):  6


Chair/Bed-to-Chair Xfer(QC):  6





Gait


Does the Patient Walk?:  Yes


Mode of Locomotion:  Walk


Anticipated Mode of Locomotion:  Walk


Walk 10 feet (QC):  6


Walk 50 ft with 2 Turns(QC):  6


Walk 150 ft (QC):  6


Distance:  500'


Gait Assistive Device:  None


Comments/Gait Description


safe and functional





Balance


Sitting Static:  Normal


Sitting Dynamic:  Normal


Standing Static:  Normal


 Standing Dynamic:  Normal





Assessment/Needs


79 y.o. female, is currently at [facility] with all gross motor skills and

does not require skilled therapy intervention.


Rehab Potential:  Fair





PT Plan


Treatment/Plan


Treatment Plan:  Discontinue PT, goals met


Treatment Plan:  Other


Treatment Duration:  Jan 17, 2020


Frequency:  1 time per week


Estimated Hrs Per Day:  .25 hour per day


Patient and/or Family Agrees t:  Yes





Time/GCodes


Time In:  901


Time Out:  909


Total Billed Treatment Time:  8


Total Billed Treatment


1 visit


EVLowc 8 min











ROBY PITTMAN PT              Jan 17, 2020 09:52

## 2020-01-17 NOTE — DIAGNOSTIC IMAGING REPORT
PROCEDURE: MR imaging of the brain without contrast.



TECHNIQUE: Multiplanar, multisequence MR imaging of the brain was

performed without contrast.



INDICATION:  Recent TIA. Evaluate for stroke. Vision changes.

Difficulty thinking. Difficulty with word finding.



Comparison:  CT head on 01/16/2020



Findings: No acute ischemia, mass, or hemorrhage. Chronic

microvascular disease is seen in the periventricular and

subcortical white matter. The ventricles and cortical sulci are

diffusely prominent. The basilar cisterns are symmetric and

unremarkable. The sellar and suprasellar regions have a normal

appearance. 



The brainstem and posterior fossa are unremarkable.



The paranasal sinuses and mastoid air cells demonstrate normal

signal characteristics. The globes and orbits are symmetric and

unremarkable. Bilateral lens implants are noted. The scalp and

calvarium have a normal appearance.



Impression:

1. No acute ischemia, mass, or hemorrhage.

2. Chronic microvascular disease.

3. Generalized parenchymal volume loss.



Dictated by: 



  Dictated on workstation # [id number]

## 2020-01-17 NOTE — DISCHARGE SUMMARY
Discharge Summary


Hospital Course


Was the Problem List Reviewed?:  Yes


Hospital Course


Date of Admission: 2020 at 11:59 


Admission Diagnosis :  





Family Physician/Provider: Nahid Davila MD  





Date of Discharge: 20 


Discharge Diagnosis: TIA, Chronic dizziness, HLP, HTN








Hospital Course:


Patient had a brief course after admitted for TIA she was placed back on ASA she

had stopped a few months ago and placed on Tely and Cardiology saw her. Patient 

had complete resolution of her symptoms and declined statin due to prior 

intolerance. MRI obtained and no evidence of CVA so she was DC in improved 

condition.














Labs and Pending Lab Test:


Laboratory Tests


20 05:42: 


White Blood Count 5.2, Red Blood Count 4.58, Hemoglobin 14.0, Hematocrit 43, 

Mean Corpuscular Volume 93, Mean Corpuscular Hemoglobin 31, Mean Corpuscular 

Hemoglobin Concent 33, Red Cell Distribution Width 13.4, Platelet Count 186, 

Mean Platelet Volume 11.3H, Neutrophils (%) (Auto) 55, Lymphocytes (%) (Auto) 

30, Monocytes (%) (Auto) 10, Eosinophils (%) (Auto) 4, Basophils (%) (Auto) 1, 

Neutrophils # (Auto) 2.9, Lymphocytes # (Auto) 1.6, Monocytes # (Auto) 0.5, 

Eosinophils # (Auto) 0.2, Basophils # (Auto) 0.0


20 05:49: 


Sodium Level 141, Potassium Level 3.7, Chloride Level 108H, Carbon Dioxide Level

22, Anion Gap 11, Blood Urea Nitrogen 15, Creatinine 0.90, Estimat Glomerular 

Filtration Rate 60, BUN/Creatinine Ratio 17, Glucose Level 84, Calcium Level 

9.2, Corrected Calcium 9.3, Total Bilirubin 0.5, Aspartate Amino Transf 

(AST/SGOT) 21, Alanine Aminotransferase (ALT/SGPT) 19, Alkaline Phosphatase 60, 

Total Protein 6.1L, Albumin 3.9, Triglycerides Level 96, Cholesterol Level 223H,

LDL Cholesterol Direct 149H, VLDL Cholesterol 19, HDL Cholesterol 62H





Microbiology


20 Urine Culture - Preliminary, Resulted


          Gram Pos Mixed Bacterial Danii


          Strep, Beta Hemolytic Group B





Home Meds


Active


Aspirin EC (Aspirin) 81 Mg Tablet. 81 Mg PO DAILY


Reported


Vitamin D3 (Cholecalciferol (Vitamin D3)) 2,000 Unit Tablet 2,000 Unit PO DAILY


Vitamin C (Ascorbate Calcium) 500 Mg Tablet 500 Mg PO DAILY


Multivitamins (Multivitamin) 1 Each Tablet 1 Tab PO DAILY


Meclizine HCl 25 Mg Tablet 25 Mg PO TID PRN


Latanoprost 2.5 Ml Drops 1 Drop OU HS


Metoprolol Tartrate 25 Mg Tablet 25 Mg PO BID


Assessment/Pt Instructions


Dr Davila in 1 week


Discharge Planning:  <30 minutes discharge planning





Discharge Instructions


Discharge Diet:  No Restrictions


Activity as Tolerated:  Yes





Discharge Physical Examination


Vital Signs





Vital Signs








  Date Time  Temp Pulse Resp B/P (MAP) Pulse Ox O2 Delivery O2 Flow Rate FiO2


 


20 08:00 36.8 83 18 147/65 (92) 96 Room Air  








General Appearance:  No Apparent Distress, WD/WN, Chronically ill


Respiratory:  Chest Non Tender, Lungs Clear, Normal Breath Sounds, No Accessory 

Muscle Use, No Respiratory Distress


Cardiovascular:  Regular Rate, Rhythm, No Edema, No Gallop, No JVD, No Murmur, 

Normal Peripheral Pulses


Neurologic/Psychiatric:  Alert, Oriented x3, No Motor/Sensory Deficits, Normal 

Mood/Affect


Allergies:  


Coded Allergies:  


     Penicillins (Unverified  Allergy, Unknown, 20)


     Statins-Hmg-Coa Reductase Inhibitor (Verified  Allergy, Unknown, 20)


   


   per patient report


     diphenhydramine (Verified  Allergy, Unknown, 19)





Discharge Summary


Date of Admission


2020 at 11:59


Date of Discharge





Discharge Date:  2020





Clinical Quality Measures


DVT/VTE Risk/Contraindication:


Risk Factor Score Per Nursin


RFS Level Per Nursing on Admit:  2=Moderate











VAIBHAV VELA DO                2020 11:43

## 2020-01-17 NOTE — DIAGNOSTIC IMAGING REPORT
Clinical indication: Patient with TIA and vision loss.



Comparison: None



Exam: Real-time carotid Doppler duplex imaging is performed

bilaterally.  Peak systolic velocity, ICA/CCA peak systolic

ratio, spectral analysis, and vascular morphology are studied.



Findings:



ARTERY          VELOCITY 

           Right        Left



CCA        1.04 m/s      0.93 m/s    

ICA        0.78 m/s      1.46 m/s

ECA        1.12 m/s      1.52 m/s

ICA/CCA    0.75          1.58



VERT.ART  Antegrade    Antegrade



There is mild bilateral carotid artery atherosclerotic disease

with the left carotid artery affected the most. There is soft

hypoechoic plaque seen within the proximal left ICA with slightly

elevated velocities. There is no significant gray scale evidence

for stenosis involving the left ECA, although soft plaque not

well visualized may be considered.



Impression:

There is bilateral carotid artery atherosclerotic disease with

the left carotid artery affected levels. There are slightly

elevated velocities of the proximal left ICA and left ECA with

soft atherosclerotic plaque seen. The left ICA/CCA ratio is less

than 2, and the degree of stenosis involving the proximal left

ICA and left ECA is suspected to be less than 50%. Given the soft

plaque on ultrasound, CT angiogram of the neck would better

evaluate.



Dictated by: 



  Dictated on workstation # [id number]

## 2020-01-17 NOTE — CARDIOLOGY PROGRESS NOTE
Cardiology SOAP Progress Note


Subjective:


No cardiac complaints.





Objective:


I&O/Vital Signs





Weight (Pounds):  142


Weight (Calculated Kilograms):  64.784167


Constitutional:  No appears stated age; AAO x 3; No apparent distress, No PERRL,

No well-developed, No well-nourished, No other


Respiratory:  No accessory muscle use, No respiratory distress, No chest tender,

No chest expansion is symmetric; chest is bilaterally symmetric; No lungs clear 

to percussion; lungs clear to auscultation; No crackles, No rhonchi, No rales, 

No stridor, No wheezing, No pleural rub, No other


Cardiovascular:  regular rate-rhythm; No irregularly irregular, No extra beats, 

No parasternal heave is noted, No JVD, No edema, No bradycardia, No tachycardia,

No point of maximal impulse, No cardiac thrills are palpable; S1 and S2; No 

gallop/S3, No gallop/S4, No diastolic murmur, No systolic murmur, No friction 

rub, No click, No other


Gastrointestional:  soft, audible bowel sounds


Extremities:  normal range of motion, non-tender, normal inspection, no lower 

extremity edema bilateral


Neurologic/Psychiatric:  no motor/sensory deficits, alert, normal mood/affect, 

oriented x 3


Skin:  normal color





Results/Procedures:


Labs





Microbiology


1/16/20 Urine Culture - Final, Complete


          Gram Pos Mixed Bacterial Danii


          Strep, Beta Hemolytic Group B


          See Comments








A/P:


Assessment/Dx:


TIA/stroke,


Hypertension


Plan:


Carotid study negative.  Telemetry for 24 hours is negative for atrial 

fibrillation.  Echocardiogram done 1/70/2020 showed normal LV function with no 

regional wall motion abnormalities.  Bubble study was negative.  Therefore no 

evidence of intracardiac shunting.  An event monitor will be placed on discharge

for a month.  We will follow the patient in 4-6 weeks.  Patient will likely 

require an implantable loop recorder as an outpatient.  Patient will continue 

aspirin for now.  She is allergic to statin therapy.





Hypertension: Continue metoprolol.





Follow-up in cardiology office in 6 weeks.








Thank you for your consultation. Please call me if you have any questions.








RADHA Blandon MD, FACP, FACC, FSCAI, FHRS, CCDS


Interventional Cardiology


Cardiac Electrophysiology


Vascular Medicine and Endovascular Interventions











CONG BLANDON MD             Jan 17, 2020 13:11

## 2020-01-17 NOTE — OCCUPATIONAL THERAPY EVAL
OT Evaluation-General/PLF


Medical Diagnosis


Admission Date


Jan 16, 2020 at 11:59


Medical Diagnosis:  TIA


Onset Date:  Jan 16, 2020





Therapy Diagnosis


Therapy Diagnosis:  impaired ADLS





Height/Weight


Height (Feet):  5


Height (Inches):  2.00


Weight (Pounds):  142





Precautions


Precautions/Isolations:  Standard Precautions





Referral


Physician:  Marek Goetz Reason:  Activity Tolerance, Self Care, Evaluation/Treatment, 

Strengthening/ROM





Medical History


Pertinent Medical History:  HTN


Additional Medical History


TIA, glaucoma


Current History


Pt admitted 1/16/2020, she reports feeling like she was having another TIA so 

she came to the hospital.


Reviewed History:  Yes





Social History


Home:  Single Level


Current Living Status:  Spouse


Entry Into Home:  Stairs With Railing


 Steps Into Home:  3





ADL-Prior Level of Function


SCALE: Activities may be completed with or without assistive devices.





6-Indepedent-patient completes the activity by him/herself with no assistance 

from a helper.


5-Set-up or Clean-up Assistance-helper sets up or cleans up; patient completes 

activity. [place] assists only prior to or  


    following the activity.


4-Supervision or Touching Assistance-helper provides verbal cues and/or 

touching/steadying and/or contact guard assistance as patient completes activit

y. Assistance may be provided   


    throughout the activity or intermittently.


3-Partial/Moderate Assistance-helper does LESS THAN HALF the effort. [place] 

lifts, holds or supports trunk or limbs, but provides less than half the effort.


2-Substantial/Maximal Assistance-helper does MORE THAN HALF the effort. [place] 

lifts or holds trunk or limbs and provides more than half the effort.


4-Jfsljchgd-gnlfen does ALL the effort. Patient does none of the effort to 

complete the activity. Or, the assistance of 2 or more helpers is required for 

the patient to complete the  


    activity.


If activity was not attempted, code reason:


7-Patient Refused.


9-Not Applicable-not attempted and the patient did not perform the activity 

before the current illness, exacerbation or injury.


10-Not Attempted due to Environmental Limitations-(lack of equipment, weather 

restraints, etc.).


88-Not Attempted due to Medical Conditions or Safety Concerns.


ADL PLOF Comments


Pt reports being independent with all ADLs at PLOF, she was independent with 

cooking, cleaning, bathing, and dressing. She denies using cane/walker.


Self Care:  Independent


Functional Cognition:  Independent


DME/Equipment:  Bath Chair, Grab Bars, Tub/Shower





OT Current Status


Subjective


Pt laying in bed at start of session, agreeable to OT evaluation and tx with 

focus on ADLs. She did not report any pain.





Mental Status/Objective


Patient Orientation:  Person, Place, Time, Situation


Attachments:  Telemetry





Current


Glasses/Contacts:  Yes


Hearing Aids:  No


Dentures/Partials:  No


Hand Dominance:  Right


Upper Extremity ROM


WFL, BUE shoulder flexion to approximately 150 degrees, she is able to touch the

back of her head.


Upper Extremity Coordination


WFL


Upper Extremity Sensation


pt reports occasional tingling/numbness in her hands. she is unable to remember 

exactly which hand she felt it in but states it is more than it used to be.


Upper Extremity Strength


grossly 4/5 MMT BUE





ADL-Treatment


Oral Hygiene (QC):  6 (independent standing at sink.)


Shower/Bathe Self (QC):  5 (set up for sponge bath. Pt able to complete all 

parts of sponge bath.)


Upper Body Dressing (QC):  5 (set up for clean gowns, pt then able to don/doff 

hospital gown.)


Lower Body Dressing (QC):  6 (Pt able to ambulate around her room without AD in 

order to gather pants/underwear. She was able to don/doff lower body clothing 

without assistance. )





Other Treatments


Pt laying in bed at start of session, provided information about PLOF and home 

set up. She agrees to completing a sponge bath and ADLs on this date. OT 

gathered ADL supplies and set up task at shower chair. Pt ambulated to shower 

without AD and  no LOB noted. She then completed dressing and sponge bath at 

shower bench. She ambulated to sink and brushed her teeth, before walking to her

recliner. Pt reports she has no concerns at this time with ADLs, and she feels 

like she is at her PLOF. Post OT session, pt seated upright in recliner with 

call light in reach and all needs met.





Education


OT Patient Education:  Correct positioning, Energy conservation, Modified ADL 

techniques, Progress toward Goal/Update tx plan, Purpose of tx/functional 

activities


Teaching Recipient:  Patient


Teaching Methods:  Discussion


Response to Teaching:  Verbalize Understanding





OT Long Term Goals


Long Term Goals


1=Demonstrate adherence to instructed precautions during ADL tasks.


2=Patient will verbalize/demonstrate understanding of assistive 

devices/modifications for ADL.


3=Patient will improve strength/tolerance for activity to enable patient to 

perform ADL's.





OT Education/Plan


Problem List/Assessment


Assessment:  No Skilled OT Needs ID'd


Pt reports no concerns with her current functional level and her ability to 

complete ADLs. She feels like she is at her PLOF. Due to pt's current functional

level and being at PLOF, no skilled OT services are indicated at this time.





Discharge Recommendations


Plan/Recommendations:  Discharge/Goals Met





Treatment Plan/Plan of Care


Patient would benefit from OT for education, treatment and training to promote i

ndependence in ADL's, mobility, safety and/or upper extremity function for 

ADL's.


Plan of Care:  ADL Retraining (eval only)


Treatment Duration:  Jan 17, 2020


Frequency:  1 time per week (eval only)


Estimated Hrs Per Day:  .25 hour per day (Eval only)





Time/GCodes


Start Time:  08:37


Stop Time:  09:02


Total Time Billed (hr/min):  25


Billed Treatment Time


1, EVL (10mins), ADL (15 mins)











HUMAIRA OCHOA OT          Jan 17, 2020 09:37

## 2020-01-19 ENCOUNTER — HOSPITAL ENCOUNTER (EMERGENCY)
Dept: HOSPITAL 75 - ER FS | Age: 80
Discharge: HOME | End: 2020-01-19
Payer: MEDICARE

## 2020-01-19 VITALS — HEIGHT: 64.96 IN | WEIGHT: 154.1 LBS | BODY MASS INDEX: 25.67 KG/M2

## 2020-01-19 VITALS — DIASTOLIC BLOOD PRESSURE: 77 MMHG | SYSTOLIC BLOOD PRESSURE: 115 MMHG

## 2020-01-19 DIAGNOSIS — Z79.82: ICD-10-CM

## 2020-01-19 DIAGNOSIS — I10: ICD-10-CM

## 2020-01-19 DIAGNOSIS — Z86.73: ICD-10-CM

## 2020-01-19 DIAGNOSIS — G45.9: Primary | ICD-10-CM

## 2020-01-19 DIAGNOSIS — Z88.0: ICD-10-CM

## 2020-01-19 DIAGNOSIS — Z88.8: ICD-10-CM

## 2020-01-19 DIAGNOSIS — Z98.51: ICD-10-CM

## 2020-01-19 LAB
ALBUMIN SERPL-MCNC: 4.4 GM/DL (ref 3.2–4.5)
ALP SERPL-CCNC: 70 U/L (ref 40–136)
ALT SERPL-CCNC: 32 U/L (ref 0–55)
APTT BLD: 25 SEC (ref 24–35)
BASOPHILS # BLD AUTO: 0.1 10^3/UL (ref 0–0.1)
BASOPHILS NFR BLD AUTO: 1 % (ref 0–10)
BILIRUB SERPL-MCNC: 0.3 MG/DL (ref 0.1–1)
BUN/CREAT SERPL: 17
CALCIUM SERPL-MCNC: 10 MG/DL (ref 8.5–10.1)
CHLORIDE SERPL-SCNC: 103 MMOL/L (ref 98–107)
CO2 SERPL-SCNC: 25 MMOL/L (ref 21–32)
CREAT SERPL-MCNC: 0.94 MG/DL (ref 0.6–1.3)
D DIMER PPP FEU-MCNC: 0.85 UG/ML (ref 0–0.49)
EOSINOPHIL # BLD AUTO: 0.1 10^3/UL (ref 0–0.3)
EOSINOPHIL NFR BLD AUTO: 2 % (ref 0–10)
ERYTHROCYTE [DISTWIDTH] IN BLOOD BY AUTOMATED COUNT: 12.7 % (ref 10–14.5)
GFR SERPLBLD BASED ON 1.73 SQ M-ARVRAT: 57 ML/MIN
GLUCOSE SERPL-MCNC: 112 MG/DL (ref 70–105)
HCT VFR BLD CALC: 46 % (ref 35–52)
HGB BLD-MCNC: 15 G/DL (ref 11.5–16)
INR PPP: 0.9 (ref 0.8–1.4)
LYMPHOCYTES # BLD AUTO: 1.5 X 10^3 (ref 1–4)
LYMPHOCYTES NFR BLD AUTO: 23 % (ref 12–44)
MANUAL DIFFERENTIAL PERFORMED BLD QL: NO
MCH RBC QN AUTO: 31 PG (ref 25–34)
MCHC RBC AUTO-ENTMCNC: 33 G/DL (ref 32–36)
MCV RBC AUTO: 93 FL (ref 80–99)
MONOCYTES # BLD AUTO: 0.5 X 10^3 (ref 0–1)
MONOCYTES NFR BLD AUTO: 7 % (ref 0–12)
NEUTROPHILS # BLD AUTO: 4.4 X 10^3 (ref 1.8–7.8)
NEUTROPHILS NFR BLD AUTO: 67 % (ref 42–75)
PLATELET # BLD: 198 10^3/UL (ref 130–400)
PMV BLD AUTO: 10.4 FL (ref 7.4–10.4)
POTASSIUM SERPL-SCNC: 4.1 MMOL/L (ref 3.6–5)
PROT SERPL-MCNC: 6.9 GM/DL (ref 6.4–8.2)
PROTHROMBIN TIME: 12.7 SEC (ref 12.2–14.7)
SODIUM SERPL-SCNC: 142 MMOL/L (ref 135–145)
WBC # BLD AUTO: 6.5 10^3/UL (ref 4.3–11)

## 2020-01-19 PROCEDURE — 85379 FIBRIN DEGRADATION QUANT: CPT

## 2020-01-19 PROCEDURE — 36415 COLL VENOUS BLD VENIPUNCTURE: CPT

## 2020-01-19 PROCEDURE — 85025 COMPLETE CBC W/AUTO DIFF WBC: CPT

## 2020-01-19 PROCEDURE — 80053 COMPREHEN METABOLIC PANEL: CPT

## 2020-01-19 PROCEDURE — 85730 THROMBOPLASTIN TIME PARTIAL: CPT

## 2020-01-19 PROCEDURE — 84484 ASSAY OF TROPONIN QUANT: CPT

## 2020-01-19 PROCEDURE — 70450 CT HEAD/BRAIN W/O DYE: CPT

## 2020-01-19 PROCEDURE — 93005 ELECTROCARDIOGRAM TRACING: CPT

## 2020-01-19 PROCEDURE — 85610 PROTHROMBIN TIME: CPT

## 2020-01-19 PROCEDURE — 71045 X-RAY EXAM CHEST 1 VIEW: CPT

## 2020-01-19 PROCEDURE — 82962 GLUCOSE BLOOD TEST: CPT

## 2020-01-19 PROCEDURE — 93041 RHYTHM ECG TRACING: CPT

## 2020-01-19 NOTE — DIAGNOSTIC IMAGING REPORT
Indication: Blurred vision, dyspnea.



Comparison: 02/09/2019.



Discussion: Single portable upright view of the chest was

obtained. Stable normal heart size. The lungs remain

hyperinflated. Mildly elevated right hemidiaphragm. No focal

consolidation, pleural fluid, or pneumothorax. No osseous

abnormality.



Impression:

1. Negative chest.



Dictated by: 



  Dictated on workstation # [id number]

## 2020-01-19 NOTE — ED NEUROLOGICAL PROBLEM
General


Stated Complaint:  BLURRY VISION,THINKS SHE MAY BE HAVING A STROKE


Source:  patient, family


Exam Limitations:  no limitations





History of Present Illness


Date Seen by Provider:  Jan 19, 2020


Time Seen by Provider:  13:09


Initial Comments


This 79-year-old white female presents with blurred vision and impaired speech 

that began shortly before presentation to the emergency department today.  

Patient had similar symptoms during this past week.  She was hospitalized at 

[place].  The patient states that her evaluation was consistent with a small 

stroke.  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





The patient denies headache, stiff neck, or photophobia.  The patient's blurred 

vision involves both eyes.  It has significantly spontaneously improved since 

its onset at home approximately an hour ago.





The patient also believes that she is having slurred speech.  Although the 

slurred speech has likely blurred vision improved there is still a residual 

effect present.





Patient denies lateralizing or localizing neurologic complaints in her arms or 

legs.  She denies chest pain, palpitations, difficulty thinking, shortness of 

breath, nausea vomiting or diarrhea.





Allergies and Home Medications


Allergies


Coded Allergies:  


     Penicillins (Unverified  Allergy, Unknown, 1/16/20)


     Statins-Hmg-Coa Reductase Inhibitor (Verified  Allergy, Unknown, 1/17/20)


   


   per patient report


     diphenhydramine (Verified  Allergy, Unknown, 6/26/19)





Home Medications


Ascorbate Calcium 500 Mg Tablet, 500 MG PO DAILY, (Reported)


Aspirin 81 Mg Tablet.dr, 81 MG PO DAILY


   Prescribed by: VAIBHAV VELA on 1/17/20 1142


Cholecalciferol (Vitamin D3) 2,000 Unit Tablet, 2,000 UNIT PO DAILY, (Reported)


Latanoprost 2.5 Ml Drops, 1 DROP OU HS, (Reported)


Meclizine HCl 25 Mg Tablet, 25 MG PO TID PRN for DIZZINESS, (Reported)


Metoprolol Tartrate 25 Mg Tablet, 25 MG PO BID, (Reported)


Multivitamin 1 Each Tablet, 1 TAB PO DAILY, (Reported)





Patient Home Medication List


Home Medication List Reviewed:  Yes





Review of Systems


Review of Systems


Constitutional:  No chills, No fever, No weakness


Eyes:  Denies Blindness; Blurred Vision


Ears, Nose, Mouth, Throat:  no symptoms reported


Respiratory:  No cough, No short of breath


Cardiovascular:  no symptoms reported; No chest pain, No palpitations


Gastrointestinal:  No abdominal pain, No diarrhea, No nausea, No vomiting


Genitourinary:  no symptoms reported


Pregnant:  No


Musculoskeletal:  no symptoms reported


Skin:  no symptoms reported; No rash


Psychiatric/Neurological:  No Symptoms Reported


Endocrine:  No Symptoms Reported


Hematologic/Lymphatic:  No Symptoms Reported





Past Medical-Social-Family Hx


Past Med/Social Hx:  Reviewed Nursing Past Med/Soc Hx


Patient Social History


2nd Hand Smoke Exposure:  No


Recent Hopitalizations:  No





Immunizations Up To Date


Date of Pneumonia Vaccine:  Oct 1, 2018





Seasonal Allergies


Seasonal Allergies:  No





Past Medical History


Surgeries:  Yes (HERNIA)


Tubal Ligation


Respiratory:  No


Cardiac:  Yes


Hypertension


Neurological:  Yes


TIA


GYN History:  Tubal Ligation


Genitourinary:  No


Gastrointestinal:  No


Musculoskeletal:  No


Endocrine:  No


HEENT:  No


Glaucoma


Cancer:  No


Psychosocial:  No


Integumentary:  No


Blood Disorders:  No


Adverse Reaction/Blood Tranf:  No





Physical Exam


Vital Signs





Vital Signs - First Documented








 1/19/20





 13:01


 


Temp 36.8


 


Pulse 80


 


Resp 18


 


B/P (MAP) 102/65 (77)


 


Pulse Ox 100


 


O2 Delivery Room Air





Capillary Refill :


Height, Weight, BMI


Height: 5'2.00"


Weight: 142lbs. oz. 64.264636qt; 28.17 BMI


Method:Stated


General Appearance:  WD/WN, no apparent distress


HEENT:  normal ENT inspection


Neck:  non-tender, full range of motion, supple


Respiratory:  lungs clear


Cardiovascular:  regular rate, rhythm


Gastrointestinal:  normal bowel sounds, non tender, soft


Back:  normal inspection


Extremities:  normal range of motion, non-tender, normal inspection


Neurologic/Psychiatric:  no motor/sensory deficits, alert, normal mood/affect


Crainal Nerves:  normal hearing, normal speech, PERRL


Motor/Sensory:  no motor deficit, no sensory deficit


Skin:  normal color, warm/dry





Stroke


Onset of Symptoms


Date of Onset of Symptoms:  Jan 19, 2020





Progress/Results/Core Measures


Results/Orders


Lab Results





Laboratory Tests








Test


 1/19/20


13:05 1/19/20


13:30 Range/Units


 


 


White Blood Count


 6.5 


 


 4.3-11.0


10^3/uL


 


Red Blood Count


 4.91 


 


 4.35-5.85


10^6/uL


 


Hemoglobin 15.0   11.5-16.0  G/DL


 


Hematocrit 46   35-52  %


 


Mean Corpuscular Volume 93   80-99  FL


 


Mean Corpuscular Hemoglobin 31   25-34  PG


 


Mean Corpuscular Hemoglobin


Concent 33 


 


 32-36  G/DL





 


Red Cell Distribution Width 12.7   10.0-14.5  %


 


Platelet Count


 198 


 


 130-400


10^3/uL


 


Mean Platelet Volume 10.4   7.4-10.4  FL


 


Neutrophils (%) (Auto) 67   42-75  %


 


Lymphocytes (%) (Auto) 23   12-44  %


 


Monocytes (%) (Auto) 7   0-12  %


 


Eosinophils (%) (Auto) 2   0-10  %


 


Basophils (%) (Auto) 1   0-10  %


 


Neutrophils # (Auto) 4.4   1.8-7.8  X 10^3


 


Lymphocytes # (Auto) 1.5   1.0-4.0  X 10^3


 


Monocytes # (Auto) 0.5   0.0-1.0  X 10^3


 


Eosinophils # (Auto)


 0.1 


 


 0.0-0.3


10^3/uL


 


Basophils # (Auto)


 0.1 


 


 0.0-0.1


10^3/uL


 


Sodium Level 142   135-145  MMOL/L


 


Potassium Level 4.1   3.6-5.0  MMOL/L


 


Chloride Level 103     MMOL/L


 


Carbon Dioxide Level 25   21-32  MMOL/L


 


Anion Gap 14   5-14  MMOL/L


 


Blood Urea Nitrogen 16   7-18  MG/DL


 


Creatinine


 0.94 


 


 0.60-1.30


MG/DL


 


Estimat Glomerular Filtration


Rate 57 


 


  





 


BUN/Creatinine Ratio 17    


 


Glucose Level 112 H    MG/DL


 


Calcium Level 10.0   8.5-10.1  MG/DL


 


Corrected Calcium 9.7   8.5-10.1  MG/DL


 


Total Bilirubin 0.3   0.1-1.0  MG/DL


 


Aspartate Amino Transf


(AST/SGOT) 33 


 


 5-34  U/L





 


Alanine Aminotransferase


(ALT/SGPT) 32 


 


 0-55  U/L





 


Alkaline Phosphatase 70     U/L


 


Troponin I < 0.30   <0.30  NG/ML


 


Total Protein 6.9   6.4-8.2  GM/DL


 


Albumin 4.4   3.2-4.5  GM/DL


 


Glucometer  101    MG/DL








My Orders





Orders - KAT STANTON MD


Cbc With Automated Diff (1/19/20 13:01)


Protime With Inr (1/19/20 13:01)


Partial Thromboplastin Time (1/19/20 13:01)


Comprehensive Metabolic Panel (1/19/20 13:01)


Fibrin Degradation Products (1/19/20 13:01)


Troponin I Fs (1/19/20 13:01)


Ua Culture If Indicated (1/19/20 13:01)


Chest 1 View Ap/Pa Only (1/19/20 13:01)


Ekg Tracing (1/19/20 13:01)


Nothing By Mouth (1/19/20 Dinner)


Accucheck Stat ONCE (1/19/20 13:01)


Ed Iv/Invasive Line Start (1/19/20 13:01)


Ed Iv/Invasive Line Start (1/19/20 13:01)


Vital Signs Stroke Patient Q15M (1/19/20 13:01)


Ct Head Wo-R/O Stroke (1/19/20 13:01)


O2 (1/19/20 13:01)


Intake & Output 06,14,22 (1/19/20 13:01)


Monitor-Rhythm Ecg Trace Only (1/19/20 13:01)


Dysphagia Screening Tool (1/19/20 13:01)


Post Thrombolytic Adminstratio (1/19/20 13:01)


Lipid Panel (1/20/20 06:00)


Ns Iv 1000 Ml (Sodium Chloride 0.9%) (1/19/20 13:15)





Vital Signs/I&O











 1/19/20





 13:01


 


Temp 36.8


 


Pulse 80


 


Resp 18


 


B/P (MAP) 102/65 (77)


 


Pulse Ox 100


 


O2 Delivery Room Air











Progress


Progress Note :  


   Time:  14:03


Progress Note


The patient's workup in the emergency department was unremarkable.  The patient 

demonstrated a sinus rhythm without an acute current of injury or dysrhythmia.  

CT of the head noncontrast was without evidence of acute pathology.





The patient's symptoms of blurred vision and slurred speech spontaneously 

resolved in the first 60 minutes of her evaluation in the emergency department.





The patient wished to return home.  She will follow up with her Dr. Davila 

tomorrow.  She promised return of her symptoms recurred.





Departure


Impression





   Primary Impression:  


   Transient cerebral ischemia


   Qualified Codes:  G45.9 - Transient cerebral ischemic attack, unspecified


Disposition:  01 HOME, SELF-CARE


Condition:  Improved





Departure-Patient Inst.


Decision time for Depature:  14:05


Referrals:  


REBEKA DAVILA MD (PCP/Family)


Primary Care Physician


Patient Instructions:  Transient Ischemic Attack





Add. Discharge Instructions:  


Close follow-up with Dr. Davila tomorrow.  Continue with your baby aspirin 81 mg 

daily.  Return to emergency department if your symptoms return.











KAT STANTON MD             Jan 19, 2020 13:14

## 2020-01-19 NOTE — DIAGNOSTIC IMAGING REPORT
PROCEDURE: CT head wo r/o stroke.



TECHNIQUE: Multiple contiguous axial images were obtained through

the brain without the use of intravenous contrast. Auto Exposure

Controls were utilized during the CT exam to meet ALARA standards

for radiation dose reduction. 



INDICATION: Blurred vision and slurred speech.



COMPARISON: 01/16/2020.



FINDINGS: No hyperdense hemorrhage or space-occupying mass. No

hydrocephalus or midline shift. No hyperdense vessel sign is

appreciated within the MCA divisions. Periventricular white

matter hypoattenuation is unchanged compatible with chronic

microvascular ischemic disease. Global atrophy is similar. No

acute skull fracture. Paranasal sinuses and mastoid air cells are

clear.



IMPRESSION:

1. No acute intracranial process by CT.

2. Exam is unchanged since 3 days prior.



Dictated by: 



  Dictated on workstation # [id number]

## 2020-04-25 ENCOUNTER — HOSPITAL ENCOUNTER (EMERGENCY)
Dept: HOSPITAL 75 - ER FS | Age: 80
LOS: 1 days | Discharge: HOME | End: 2020-04-26
Payer: MEDICARE

## 2020-04-25 VITALS — BODY MASS INDEX: 25.56 KG/M2 | WEIGHT: 138.89 LBS | HEIGHT: 61.81 IN

## 2020-04-25 DIAGNOSIS — Z88.8: ICD-10-CM

## 2020-04-25 DIAGNOSIS — H53.121: ICD-10-CM

## 2020-04-25 DIAGNOSIS — Z98.51: ICD-10-CM

## 2020-04-25 DIAGNOSIS — Z88.0: ICD-10-CM

## 2020-04-25 DIAGNOSIS — I10: ICD-10-CM

## 2020-04-25 DIAGNOSIS — Z86.73: ICD-10-CM

## 2020-04-25 DIAGNOSIS — Z88.5: ICD-10-CM

## 2020-04-25 DIAGNOSIS — I67.4: Primary | ICD-10-CM

## 2020-04-25 LAB
ALBUMIN SERPL-MCNC: 4 GM/DL (ref 3.2–4.5)
ALP SERPL-CCNC: 63 U/L (ref 40–136)
ALT SERPL-CCNC: 15 U/L (ref 0–55)
BASOPHILS # BLD AUTO: 0.1 10^3/UL (ref 0–0.1)
BASOPHILS NFR BLD AUTO: 1 % (ref 0–10)
BILIRUB SERPL-MCNC: 0.3 MG/DL (ref 0.1–1)
BUN/CREAT SERPL: 17
CALCIUM SERPL-MCNC: 9.3 MG/DL (ref 8.5–10.1)
CHLORIDE SERPL-SCNC: 102 MMOL/L (ref 98–107)
CO2 SERPL-SCNC: 24 MMOL/L (ref 21–32)
CREAT SERPL-MCNC: 0.93 MG/DL (ref 0.6–1.3)
EOSINOPHIL # BLD AUTO: 0.2 10^3/UL (ref 0–0.3)
EOSINOPHIL NFR BLD AUTO: 3 % (ref 0–10)
ERYTHROCYTE [DISTWIDTH] IN BLOOD BY AUTOMATED COUNT: 12.8 % (ref 10–14.5)
GFR SERPLBLD BASED ON 1.73 SQ M-ARVRAT: 58 ML/MIN
GLUCOSE SERPL-MCNC: 108 MG/DL (ref 70–105)
HCT VFR BLD CALC: 40 % (ref 35–52)
HGB BLD-MCNC: 13.4 G/DL (ref 11.5–16)
LYMPHOCYTES # BLD AUTO: 1.8 X 10^3 (ref 1–4)
LYMPHOCYTES NFR BLD AUTO: 26 % (ref 12–44)
MANUAL DIFFERENTIAL PERFORMED BLD QL: NO
MCH RBC QN AUTO: 31 PG (ref 25–34)
MCHC RBC AUTO-ENTMCNC: 33 G/DL (ref 32–36)
MCV RBC AUTO: 92 FL (ref 80–99)
MONOCYTES # BLD AUTO: 0.6 X 10^3 (ref 0–1)
MONOCYTES NFR BLD AUTO: 9 % (ref 0–12)
NEUTROPHILS # BLD AUTO: 4.1 X 10^3 (ref 1.8–7.8)
NEUTROPHILS NFR BLD AUTO: 61 % (ref 42–75)
PLATELET # BLD: 169 10^3/UL (ref 130–400)
PMV BLD AUTO: 11.1 FL (ref 7.4–10.4)
POTASSIUM SERPL-SCNC: 4.5 MMOL/L (ref 3.6–5)
PROT SERPL-MCNC: 6 GM/DL (ref 6.4–8.2)
SODIUM SERPL-SCNC: 138 MMOL/L (ref 135–145)
WBC # BLD AUTO: 6.7 10^3/UL (ref 4.3–11)

## 2020-04-25 PROCEDURE — 93005 ELECTROCARDIOGRAM TRACING: CPT

## 2020-04-25 PROCEDURE — 93041 RHYTHM ECG TRACING: CPT

## 2020-04-25 PROCEDURE — 85730 THROMBOPLASTIN TIME PARTIAL: CPT

## 2020-04-25 PROCEDURE — 85610 PROTHROMBIN TIME: CPT

## 2020-04-25 PROCEDURE — 71045 X-RAY EXAM CHEST 1 VIEW: CPT

## 2020-04-25 PROCEDURE — 70498 CT ANGIOGRAPHY NECK: CPT

## 2020-04-25 PROCEDURE — 70450 CT HEAD/BRAIN W/O DYE: CPT

## 2020-04-25 PROCEDURE — 70496 CT ANGIOGRAPHY HEAD: CPT

## 2020-04-25 PROCEDURE — 36415 COLL VENOUS BLD VENIPUNCTURE: CPT

## 2020-04-25 PROCEDURE — 85025 COMPLETE CBC W/AUTO DIFF WBC: CPT

## 2020-04-25 PROCEDURE — 80053 COMPREHEN METABOLIC PANEL: CPT

## 2020-04-25 PROCEDURE — 82962 GLUCOSE BLOOD TEST: CPT

## 2020-04-25 PROCEDURE — 84484 ASSAY OF TROPONIN QUANT: CPT

## 2020-04-25 PROCEDURE — 85379 FIBRIN DEGRADATION QUANT: CPT

## 2020-04-25 NOTE — XMS REPORT
Continuity of Care Document

                             Created on: 2020



Kathia Camargo

External Reference #: [id number]

: 1940

Sex: Female



Demographics





                          Address                   [facility] [place]  [address]

 

                          Home Phone                [phone] x

 

                          Preferred Language        Unknown

 

                          Marital Status            Unknown

 

                          [employer] Affiliation     Unknown

 

                          Race                      Unknown

 

                          Ethnic Group              Unknown





Author





                          Organization              Unknown

 

                          Address                   Unknown

 

                          Phone                     Unavailable



              



Allergies

      



             Active           Description           Code           Type         

  Severity   

                Reaction           Onset           Reported/Identified          

 

Relationship to Patient                 Clinical Status        

 

                Yes             diphenhydramine           [id number]           D

rug Allergy       

           Unknown           N/A                       2019               

        

        

 

             Yes           PENICILLIN           PENICILLIN                      

  Unknown     

             N/A                        2019                              

   

 

             Yes           Penicillins           [id number]           Drug Aller

gy           

Unknown           N/A                        2020                         

  

     

 

                    Yes                 Statins-Hmg-Coa Reductase Inhibitor     

      [id number]          

             Drug Allergy           Unknown           N/A                       

 2020   

                                                             



                        



Medications

      



There is no data.                  



Problems

      



             Date Dx Coded           Attending           Type           Code    

       

Diagnosis                               Diagnosed By        

 

             2019           CALLI NIETO           Ot           G45

.9           

TRANSIENT CEREBRAL ISCHEMIC ATTACK, [facility]                    

 

             2019           CALLI NIETO           Ot           H53

.8           

OTHER VISUAL DISTURBANCES                        

 

             2019           CALLI NIETO           Ot           I10

           

ESSENTIAL (PRIMARY) HYPERTENSION                    

 

                2019           CALLI NIETO           Ot           

   Z87.19          

                          PERSONAL HISTORY OF OTHER DISEASES OF               

      

 

                2019           CALLI NIETO           Ot           

   Z98.51          

                          TUBAL LIGATION STATUS                    

 

             2019           CALLI NIETO           Ot           G45

.9           

TRANSIENT CEREBRAL ISCHEMIC ATTACK, [facility]                    

 

             2019           CALLI NIETO           Ot           H53

.8           

OTHER VISUAL DISTURBANCES                        

 

             2019           CALLI NIETO           Ot           I10

           

ESSENTIAL (PRIMARY) HYPERTENSION                    

 

                2019           CALLI NIETO           Ot           

   Z87.19          

                          PERSONAL HISTORY OF OTHER DISEASES OF               

      

 

                2019           CALLI NIETO           Ot           

   Z98.51          

                          TUBAL LIGATION STATUS                    

 

             2019           WILDA PARKINSON MD, Ot           I10    

       

ESSENTIAL (PRIMARY) HYPERTENSION                    

 

             2019           WILDA PARKINSON MD           Ot           R42    

       

DIZZINESS AND GIDDINESS                          

 

             2019           WILDA PARKINSON MD           Ot           Z86.73 

          

PRSNL HX OF TIA (TIA), AND CEREB INFRC W                    

 

             2019           WILDA PARKINSON MD, Ot           Z98.51 

          

TUBAL LIGATION STATUS                            

 

             2019           WILDA PARKINSON MD           Ot           I10    

       

ESSENTIAL (PRIMARY) HYPERTENSION                    

 

             2019           WILDA PARKINSON MD           Ot           R42    

       

DIZZINESS AND GIDDINESS                          

 

             2019           WILDA PARKINSON MD           Ot           Z86.73 

          

PRSNL HX OF TIA (TIA), AND CEREB INFRC W                    

 

             2019           WILDA PARKINSON MD           Ot           Z98.51 

          

TUBAL LIGATION STATUS                            

 

             2019           YAHIR OLIVER DO, Ot           I10

           

ESSENTIAL (PRIMARY) HYPERTENSION                    

 

                2019           YAHIR OLIVER DO           Ot           

   L03.031         

                          CELLULITIS OF RIGHT TOE                    

 

                2019           YAHIR OLIVER DO           Ot           

   T25.231A        

                          BURN OF SECOND DEGREE OF RIGHT TOE(S) (N              

      

 

             2019           YAHIR OLIVER DO           Ot           T31

.0           

BURNS INVOLVING LESS THAN 10% OF BODY TRAYLOR                    

 

                2019           YAHIR OLIVER DO           Ot           

   X11.8XXA        

                          CONTACT WITH OTHER HOT TAP-WATER, INITIA              

      

 

                2019           YAHIR OLIVER DO, Ot           

   Z86.73          

                          PRSNL HX OF TIA (TIA), AND CEREB INFRC W              

      

 

             2019           YAHIR OLIVER DO           Ot           Z88

.0           

ALLERGY STATUS TO PENICILLIN                     

 

             2019           YAHIR OLIVER DO           Ot           Z88

.5           

ALLERGY STATUS TO NARCOTIC AGENT STATUS                    

 

             2019           YAHIR OLIVER DO           Ot           Z88

.8           

ALLERGY STATUS TO OTH DRUG/MEDS/BIOL SUB                    

 

                2019           YAHIR OLIVER DO           Ot           

   Z98.51          

                          TUBAL LIGATION STATUS                    

 

                2019           YAHIR OLIVER DO           Ot           

   Z98.890         

                          OTHER SPECIFIED POSTPROCEDURAL STATES                 

   

 

             2019           YAHIR OLIVER DO, Ot           I10

           

ESSENTIAL (PRIMARY) HYPERTENSION                    

 

                2019           YAHIR OLIVER DO           Ot           

   L03.031         

                          CELLULITIS OF RIGHT TOE                    

 

                2019           YAHIR OLIVER DO           Ot           

   T25.231A        

                          BURN OF SECOND DEGREE OF RIGHT TOE(S) (N              

      

 

             2019           YAHIR OLIVER DO, Ot           T31

.0           

BURNS INVOLVING LESS THAN 10% OF BODY TRAYLOR                    

 

                2019           YAHIR OLIVER DO           Ot           

   X11.8XXA        

                          CONTACT WITH OTHER HOT TAP-WATER, INITIA              

      

 

                2019           YAHIR OLIVER DO, Ot           

   Z86.73          

                          PRSNL HX OF TIA (TIA), AND CEREB INFRC W              

      

 

             2019           YAHIR OLIVER DO           Ot           Z88

.0           

ALLERGY STATUS TO PENICILLIN                     

 

             2019           YAHIR OLIVER DO           Ot           Z88

.5           

ALLERGY STATUS TO NARCOTIC AGENT STATUS                    

 

             2019           YAHIR OLIVER DO           Ot           Z88

.8           

ALLERGY STATUS TO OTH DRUG/MEDS/BIOL SUB                    

 

                2019           YAHIR OLIVER DO           Ot           

   Z98.51          

                          TUBAL LIGATION STATUS                    

 

                2019           YAHIR OLIVER DO           Ot           

   Z98.890         

                          OTHER SPECIFIED POSTPROCEDURAL STATES                 

   

 

             2020           VELA DO, VAIBHAV           Ot           E78.5 

          

HYPERLIPIDEMIA, UNSPECIFIED                      

 

             2020           VELA DO, VAIBHAV           Ot           G45.9 

          

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                    

 

             2020           VELA DO, VAIBHAV           Ot           H40.9 

          

UNSPECIFIED GLAUCOMA                             

 

             2020           VELA DO, VAIBHAV           Ot           I07.1 

          

RHEUMATIC TRICUSPID INSUFFICIENCY                    

 

             2020           VELA DO, VAIBHAV           Ot           I10   

        

ESSENTIAL (PRIMARY) HYPERTENSION                    

 

             2020           VELA DO, VAIBHAV           Ot           R42   

        

DIZZINESS AND GIDDINESS                          

 

             2020           VELA DO, VAIBHAV           Ot           R55   

        

SYNCOPE AND COLLAPSE                             

 

             2020           VELA DO, VAIBHAV           Ot           Z79.89

9           

OTHER LONG TERM (CURRENT) DRUG THERAPY                    

 

             2020           VELA DO, VAIBHAV           Ot           E78.5 

          

HYPERLIPIDEMIA, UNSPECIFIED                      

 

             2020           VELA DO, VAIBHAV           Ot           G45.9 

          

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                    

 

             2020           VELA DO, VAIBHAV           Ot           H40.9 

          

UNSPECIFIED GLAUCOMA                             

 

             2020           VELA DO, VAIBHAV           Ot           I07.1 

          

RHEUMATIC TRICUSPID INSUFFICIENCY                    

 

             2020           VELA DO, VAIBHAV           Ot           I10   

        

ESSENTIAL (PRIMARY) HYPERTENSION                    

 

             2020           VELA DO, VAIBHAV           Ot           R42   

        

DIZZINESS AND GIDDINESS                          

 

             2020           VELA DO, VAIBHAV           Ot           R55   

        

SYNCOPE AND COLLAPSE                             

 

             2020           VELA DO, VAIBHAV           Ot           Z79.89

9           

OTHER LONG TERM (CURRENT) DRUG THERAPY                    

 

             2020           ROMY HAQUE, KAT CORTES           Ot           G45.

9           

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                    

 

             2020           ROMY HAQUE, KAT CORTES           Ot           H53.

8           

OTHER VISUAL DISTURBANCES                        

 

             2020           ROMY HAQUE, KAT S           Ot           I10 

          

ESSENTIAL (PRIMARY) HYPERTENSION                    

 

             2020           ROMY HAQUE, KAT CORTES           Ot           Z79.

82           

LONG TERM (CURRENT) USE OF ASPIRIN                    

 

             2020           ROMY HAQUE, KAT S           Ot           Z86.

73           

PRSNL HX OF TIA (TIA), AND CEREB INFRC W                    

 

             2020           ROMY HAQUE, KAT S           Ot           Z88.

0           

ALLERGY STATUS TO PENICILLIN                     

 

             2020           ROMY HAQUE, KAT CORTES           Ot           Z88.

8           

ALLERGY STATUS TO OTH DRUG/MEDS/BIOL SUB                    

 

             2020           ROMY HAQUE, KAT CORTES           Ot           Z98.

51           

TUBAL LIGATION STATUS                            

 

             2020           ROMY HAQUE, KAT S           Ot           G45.

9           

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                    

 

             2020           ROMY HAQUE, KAT CORTES           Ot           H53.

8           

OTHER VISUAL DISTURBANCES                        

 

             2020           ROMY HAQUE, KAT CORTES           Ot           I10 

          

ESSENTIAL (PRIMARY) HYPERTENSION                    

 

             2020           ROMY HAQUE, KAT S           Ot           Z79.

82           

LONG TERM (CURRENT) USE OF ASPIRIN                    

 

             2020           ROMY HAQUE, KAT CORTES           Ot           Z86.

73           

PRSNL HX OF TIA (TIA), AND CEREB INFRC W                    

 

             2020           ROMY HAQUE, KAT S           Ot           Z88.

0           

ALLERGY STATUS TO PENICILLIN                     

 

             2020           ROMY HAQUE, KAT CORTES           Ot           Z88.

8           

ALLERGY STATUS TO OTH DRUG/MEDS/BIOL SUB                    

 

             2020           ROMY HAQUE, KAT S           Ot           Z98.

51           

TUBAL LIGATION STATUS                            

 

             2020           ROMY HAQUE, KAT CORTES           Ot           G45.

9           

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                    

 

             2020           ROMY HAQUE, KAT S           Ot           H53.

8           

OTHER VISUAL DISTURBANCES                        

 

             2020           ROMY HAQUE, KAT S           Ot           I10 

          

ESSENTIAL (PRIMARY) HYPERTENSION                    

 

             2020           ROMY HAQUE, KAT S           Ot           Z79.

82           

LONG TERM (CURRENT) USE OF ASPIRIN                    

 

             2020           ROMY HAQUE, KAT CORTES           Ot           Z86.

73           

PRSNL HX OF TIA (TIA), AND CEREB INFRC W                    

 

             2020           ROMY HAQUE, KAT S           Ot           Z88.

0           

ALLERGY STATUS TO PENICILLIN                     

 

             2020           ROMY HAQUE, KAT CORTES           Ot           Z88.

8           

ALLERGY STATUS TO OTH DRUG/MEDS/BIOL SUB                    

 

             2020           ROMY HQAUE, KAT CORTES           Ot           Z98.

51           

TUBAL LIGATION STATUS                            

 

             2020           HÉCTOR HAQUE, CONG WINN           Ot           G45

.9           

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                    

 

             2020           HÉCTOR HAQUE, CONG WINN           Ot           I63

.9           

CEREBRAL INFARCTION, UNSPECIFIED                    

 

             2020           VELA DO, VAIBHAV           Ot           E78.5 

          

HYPERLIPIDEMIA, UNSPECIFIED                      

 

             2020           VELA DO, VAIBHAV           Ot           G45.9 

          

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                    

 

             2020           VELA DO, VAIBHAV           Ot           H40.9 

          

UNSPECIFIED GLAUCOMA                             

 

             2020           VELA DO, VAIBHAV           Ot           I07.1 

          

RHEUMATIC TRICUSPID INSUFFICIENCY                    

 

             2020           VELA DO, VAIBHAV           Ot           I10   

        

ESSENTIAL (PRIMARY) HYPERTENSION                    

 

             2020           VELA DO, VAIBHAV           Ot           R42   

        

DIZZINESS AND GIDDINESS                          

 

             2020           VELA DO, VAIBHAV           Ot           R55   

        

SYNCOPE AND COLLAPSE                             

 

             2020           VELA DO, VAIBHAV           Ot           Z79.89

9           

OTHER LONG TERM (CURRENT) DRUG THERAPY                    

 

             2020           VELA DO, VAIBHAV           Ot           E78.5 

          

HYPERLIPIDEMIA, UNSPECIFIED                      

 

             2020           VELA DO, VAIBHAV           Ot           G45.9 

          

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                    

 

             2020           VELA DO, VAIBHAV           Ot           H40.9 

          

UNSPECIFIED GLAUCOMA                             

 

             2020           VELA DO, VAIBHAV           Ot           I07.1 

          

RHEUMATIC TRICUSPID INSUFFICIENCY                    

 

             2020           VELA DO, VAIBHAV           Ot           I10   

        

ESSENTIAL (PRIMARY) HYPERTENSION                    

 

             2020           VELA DO, VAIBHAV           Ot           R42   

        

DIZZINESS AND GIDDINESS                          

 

             2020           VELA DO, VAIBHAV           Ot           R55   

        

SYNCOPE AND COLLAPSE                             

 

             2020           VELA DO, VAIBHAV           Ot           Z79.89

9           

OTHER LONG TERM (CURRENT) DRUG THERAPY                    

 

             2020           HÉCTOR HAQUE, CONG WINN           Ot           G45

.9           

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                    

 

             2020           HÉCTOR HAQUE, CONG WINN           Ot           I63

.9           

CEREBRAL INFARCTION, UNSPECIFIED                    

 

             2020           HÉCTOR HAQUE, CONG WINN           Ot           G45

.9           

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                    

 

             2020           CONG ANAYA MD           Ot           I63

.9           

CEREBRAL INFARCTION, UNSPECIFIED                    



                                                                                
                                                                                
                                                          



Procedures

      



There is no data.                  



Results

      



                    Test                Result              Range        

 

                                        Complete blood count (CBC) with automate

d white blood cell (WBC) differential - 

19 16:36         

 

                          Blood leukocytes automated count (number/volume)      

     7.0 10*3/uL          

                                        4.3-11.0        

 

                          Blood erythrocytes automated count (number/volume)    

       4.62 10*6/uL       

                                        4.35-5.85        

 

                    Venous blood hemoglobin measurement (mass/volume)           

14.1 g/dL           

11.5-16.0        

 

                    Blood hematocrit (volume fraction)           43 %           

     35-52        

 

                    Automated erythrocyte mean corpuscular volume           93 [

foz_us]           

80-99        

 

                                        Automated erythrocyte mean corpuscular h

emoglobin (mass per erythrocyte)        

                          31 pg                     25-34        

 

                                        Automated erythrocyte mean corpuscular h

emoglobin concentration measurement 

(mass/volume)             33 g/dL                   32-36        

 

                    Automated erythrocyte distribution width ratio           13.

6 %              10.0-

14.5        

 

                    Automated blood platelet count (count/volume)           181 

10*3/uL           

130-400        

 

                          Automated blood platelet mean volume measurement      

     10.7 [foz_us]        

                                        7.4-10.4        

 

                    Automated blood neutrophils/100 leukocytes           75 %   

             42-75       

 

 

                    Automated blood lymphocytes/100 leukocytes           14 %   

             12-44       

 

 

                    Blood monocytes/100 leukocytes           9 %                

 0-12        

 

                    Automated blood eosinophils/100 leukocytes           1 %    

             0-10        

 

                    Automated blood basophils/100 leukocytes           0 %      

           0-10        

 

                    Blood neutrophils automated count (number/volume)           

5.2 10*3            

1.8-7.8        

 

                    Blood lymphocytes automated count (number/volume)           

1.0 10*3            

1.0-4.0        

 

                    Blood monocytes automated count (number/volume)           0.

6 10*3            

0.0-1.0        

 

                    Automated eosinophil count           0.1 10*3/uL           0

.0-0.3        

 

                    Automated blood basophil count (count/volume)           0.0 

10*3/uL           

0.0-0.1        

 

                                        PT panel in platelet poor plasma by coag

ulation assay - 19 16:36         

 

                          Prothrombin time (PT) in platelet poor plasma by coagu

lation assay           

13.0 s                                  12.2-14.7        

 

                          INR in platelet poor plasma or blood by coagulation as

say           1.0         

                                        0.8-1.4        

 

                                        Activated partial thromboplastin time (a

PTT) in platelet poor plasma 

bycoagulation assay - 19 16:36         

 

                                        Activated partial thromboplastin time (a

PTT) in platelet poor plasma 

bycoagulation assay           26 s                      24-35        

 

                                        Fibrin D-dimer FEU measurement in platel

et poor plasma (mass/volume) - 19 

16:36         

 

                          Fibrin D-dimer FEU measurement in platelet poor plasma

 (mass/volume)           

0.79 ug/mL                              0.00-0.49        

 

                                        Comprehensive metabolic panel - 19

 16:36         

 

                          Serum or plasma sodium measurement (moles/volume)     

      143 mmol/L          

                                        135-145        

 

                          Serum or plasma potassium measurement (moles/volume)  

         3.9 mmol/L       

                                        3.6-5.0        

 

                          Serum or plasma chloride measurement (moles/volume)   

        108 mmol/L        

                                                

 

                    Carbon dioxide           24 mmol/L           21-32        

 

                          Serum or plasma anion gap determination (moles/volume)

           11 mmol/L      

                                        5-14        

 

                          Serum or plasma urea nitrogen measurement (mass/volume

)           17 mg/dL      

                                        7-18        

 

                          Serum or plasma creatinine measurement (mass/volume)  

         1.02 mg/dL       

                                        0.60-1.30        

 

                    Serum or plasma urea nitrogen/creatinine mass ratio         

  17                  NRG 

       

 

                                        Serum or plasma creatinine measurement w

ith calculation of estimated glomerular 

filtration rate           52                        NRG        

 

                    Serum or plasma glucose measurement (mass/volume)           

111 mg/dL           

        

 

                    Serum or plasma calcium measurement (mass/volume)           

9.5 mg/dL           

8.5-10.1        

 

                          Serum or plasma total bilirubin measurement (mass/volu

me)           0.3 mg/dL   

                                        0.1-1.0        

 

                                        Serum or plasma alkaline phosphatase isac

surement (enzymatic activity/volume)    

                          72 U/L                            

 

                                        Serum or plasma aspartate aminotransfera

se measurement (enzymatic 

activity/volume)           26 U/L                    5-34        

 

                                        Serum or plasma alanine aminotransferase

 measurement (enzymatic activity/volume)

                          25 U/L                    0-55        

 

                    Serum or plasma protein measurement (mass/volume)           

6.4 g/dL            

6.4-8.2        

 

                    Serum or plasma albumin measurement (mass/volume)           

4.2 g/dL            

3.2-4.5        

 

                    CALCIUM CORRECTED           9.3 mg/dL           8.5-10.1    

    

 

                                        Serum or plasma troponin i.cardiac measu

rement (mass/volume) - 19 16:36   

      

 

                          Serum or plasma troponin i.cardiac measurement (mass/v

olume)           < ng/mL  

                                        <0.028        

 

                                        Complete urinalysis with reflex to cultu

re - 19 16:41         

 

                    Urine color determination           YELLOW              NRG 

       

 

                    Urine clarity determination           CLEAR               NR

G        

 

                    Urine pH measurement by test strip           5              

     5-9        

 

                    Specific gravity of urine by test strip           1.010     

          1.016-1.022  

      

 

                          Urine protein assay by test strip, semi-quantitative  

         NEGATIVE         

                                        NEGATIVE        

 

                    Urine glucose detection by automated test strip           NE

GATIVE            

NEGATIVE        

 

                          Erythrocytes detection in urine sediment by light micr

oscopy           NEGATIVE 

                                        NEGATIVE        

 

                    Urine ketones detection by automated test strip           NE

GATIVE            

NEGATIVE        

 

                    Urine nitrite detection by test strip           NEGATIVE    

        NEGATIVE    

    

 

                    Urine total bilirubin detection by test strip           NEGA

TIVE            

NEGATIVE        

 

                          Urine urobilinogen measurement by automated test strip

 (mass/volume)           

NORMAL                                  NORMAL        

 

                    Urine leukocyte esterase detection by dipstick           1+ 

                 NEGATIVE 

       

 

                                        Automated urine sediment erythrocyte cou

nt by microscopy (number/high power 

field)                    NONE                      NRG        

 

                                        Automated urine sediment leukocyte count

 by microscopy (number/high power field)

                           [HPF]                    NRG        

 

                          Bacteria detection in urine sediment by light microsco

py           NEGATIVE     

                                        NRG        

 

                                        Squamous epithelial cells detection in u

rine sediment by light microscopy       

                          0-2                       NRG        

 

                          Crystals detection in urine sediment by light microsco

py           NONE         

                                        NRG        

 

                    Casts detection in urine sediment by light microscopy       

    NONE                

NRG        

 

                          Mucus detection in urine sediment by light microscopy 

          NEGATIVE        

                                        NRG        

 

                    Complete urinalysis with reflex to culture           NO     

             NRG        

 

                                        Complete blood count (CBC) with automate

d white blood cell (WBC) differential - 

19 16:57         

 

                          Blood leukocytes automated count (number/volume)      

     6.0 10*3/uL          

                                        4.3-11.0        

 

                          Blood erythrocytes automated count (number/volume)    

       4.45 10*6/uL       

                                        4.35-5.85        

 

                    Venous blood hemoglobin measurement (mass/volume)           

13.6 g/dL           

11.5-16.0        

 

                    Blood hematocrit (volume fraction)           42 %           

     35-52        

 

                    Automated erythrocyte mean corpuscular volume           94 [

foz_us]           

80-99        

 

                                        Automated erythrocyte mean corpuscular h

emoglobin (mass per erythrocyte)        

                          31 pg                     25-34        

 

                                        Automated erythrocyte mean corpuscular h

emoglobin concentration measurement 

(mass/volume)             33 g/dL                   32-36        

 

                    Automated erythrocyte distribution width ratio           12.

9 %              10.0-

14.5        

 

                    Automated blood platelet count (count/volume)           168 

10*3/uL           

130-400        

 

                          Automated blood platelet mean volume measurement      

     11.3 [foz_us]        

                                        7.4-10.4        

 

                    Automated blood neutrophils/100 leukocytes           63 %   

             42-75       

 

 

                    Automated blood lymphocytes/100 leukocytes           25 %   

             12-44       

 

 

                    Blood monocytes/100 leukocytes           9 %                

 0-12        

 

                    Automated blood eosinophils/100 leukocytes           2 %    

             0-10        

 

                    Automated blood basophils/100 leukocytes           1 %      

           0-10        

 

                    Blood neutrophils automated count (number/volume)           

3.8 10*3            

1.8-7.8        

 

                    Blood lymphocytes automated count (number/volume)           

1.5 10*3            

1.0-4.0        

 

                    Blood monocytes automated count (number/volume)           0.

6 10*3            

0.0-1.0        

 

                    Automated eosinophil count           0.1 10*3/uL           0

.0-0.3        

 

                    Automated blood basophil count (count/volume)           0.0 

10*3/uL           

0.0-0.1        

 

                                        PT panel in platelet poor plasma by coag

ulation assay - 19 16:57         

 

                          Prothrombin time (PT) in platelet poor plasma by coagu

lation assay           

13.1 s                                  12.2-14.7        

 

                          INR in platelet poor plasma or blood by coagulation as

say           1.0         

                                        0.8-1.4        

 

                                        Activated partial thromboplastin time (a

PTT) in platelet poor plasma 

bycoagulation assay - 19 16:57         

 

                                        Activated partial thromboplastin time (a

PTT) in platelet poor plasma 

bycoagulation assay           25 s                      24-35        

 

                                        Comprehensive metabolic panel - 19

 16:57         

 

                          Serum or plasma sodium measurement (moles/volume)     

      140 mmol/L          

                                        135-145        

 

                          Serum or plasma potassium measurement (moles/volume)  

         4.1 mmol/L       

                                        3.6-5.0        

 

                          Serum or plasma chloride measurement (moles/volume)   

        102 mmol/L        

                                                

 

                    Carbon dioxide           19 mmol/L           21-32        

 

                          Serum or plasma anion gap determination (moles/volume)

           19 mmol/L      

                                        5-14        

 

                          Serum or plasma urea nitrogen measurement (mass/volume

)           18 mg/dL      

                                        7-18        

 

                          Serum or plasma creatinine measurement (mass/volume)  

         0.88 mg/dL       

                                        0.60-1.30        

 

                    Serum or plasma urea nitrogen/creatinine mass ratio         

  20                  NRG 

       

 

                                        Serum or plasma creatinine measurement w

ith calculation of estimated glomerular 

filtration rate           >                         NRG        

 

                    Serum or plasma glucose measurement (mass/volume)           

99 mg/dL            

        

 

                    Serum or plasma calcium measurement (mass/volume)           

9.2 mg/dL           

8.5-10.1        

 

                          Serum or plasma total bilirubin measurement (mass/volu

me)           0.2 mg/dL   

                                        0.1-1.0        

 

                                        Serum or plasma alkaline phosphatase isac

surement (enzymatic activity/volume)    

                          66 U/L                            

 

                                        Serum or plasma aspartate aminotransfera

se measurement (enzymatic 

activity/volume)           19 U/L                    5-34        

 

                                        Serum or plasma alanine aminotransferase

 measurement (enzymatic activity/volume)

                          22 U/L                    0-55        

 

                    Serum or plasma protein measurement (mass/volume)           

6.0 g/dL            

6.4-8.2        

 

                    Serum or plasma albumin measurement (mass/volume)           

3.9 g/dL            

3.2-4.5        

 

                    CALCIUM CORRECTED           9.3 mg/dL           8.5-10.1    

    

 

                                        TROPONIN T - 19 16:57         

 

                    TROPONIN T           12 %                <=10        

 

                                        Complete urinalysis with reflex to cultu

re - 19 18:27         

 

                    Urine color determination           YELLOW              NRG 

       

 

                    Urine clarity determination           CLEAR               NR

G        

 

                    Urine pH measurement by test strip           6.5            

     5-9        

 

                    Specific gravity of urine by test strip           <         

          1.016-1.022      

  

 

                          Urine protein assay by test strip, semi-quantitative  

         NEGATIVE         

                                        NEGATIVE        

 

                    Urine glucose detection by automated test strip           NE

GATIVE            

NEGATIVE        

 

                          Erythrocytes detection in urine sediment by light micr

oscopy           TRACE    

                                        NEGATIVE        

 

                    Urine ketones detection by automated test strip           NE

GATIVE            

NEGATIVE        

 

                    Urine nitrite detection by test strip           NEGATIVE    

        NEGATIVE    

    

 

                    Urine total bilirubin detection by test strip           NEGA

TIVE            

NEGATIVE        

 

                          Urine urobilinogen measurement by automated test strip

 (mass/volume)           

0.2 mg/dL                               NORMAL        

 

                    Urine leukocyte esterase detection by dipstick           TRA

CE               

NEGATIVE        

 

                                        Automated urine sediment erythrocyte cou

nt by microscopy (number/high power 

field)                    NONE                      NRG        

 

                                        Automated urine sediment leukocyte count

 by microscopy (number/high power field)

                           [HPF]                    NRG        

 

                          Bacteria detection in urine sediment by light microsco

py           NONE         

                                        NRG        

 

                                        Squamous epithelial cells detection in u

rine sediment by light microscopy       

                          0-2                       NRG        

 

                          Crystals detection in urine sediment by light microsco

py           NONE         

                                        NRG        

 

                    Casts detection in urine sediment by light microscopy       

    NONE                

NRG        

 

                          Mucus detection in urine sediment by light microscopy 

          NEGATIVE        

                                        NRG        

 

                    Complete urinalysis with reflex to culture           NO     

             NRG        

 

                                        CMP - 19 08:09         

 

                    GLUCOSE             96 mg/dL            65-99        

 

                    UREA NITROGEN (BUN)           12 mg/dL            7-25      

  

 

                    CREATININE           0.93 mg/dL           0.60-0.88        

 

                    eGFR NON-AFR. AMERICAN           58 mL/min/1.73m2           

> OR = 60        

 

                    eGFR            67 mL/min/1.73m2           >

 OR = 60        

 

                    BUN/CREATININE RATIO           13 (calc)           6-22     

   

 

                    SODIUM              143 mmol/L           135-146        

 

                    POTASSIUM           4.6 mmol/L           3.5-5.3        

 

                    CHLORIDE            108 mmol/L                   

 

                    CARBON DIOXIDE           28 mmol/L           20-32        

 

                    CALCIUM             9.2 mg/dL           8.6-10.4        

 

                    PROTEIN, TOTAL           5.9 g/dL            6.1-8.1        

 

                    ALBUMIN             4.0 g/dL            3.6-5.1        

 

                    GLOBULIN            1.9 g/dL (calc)           1.9-3.7       

 

 

                    ALBUMIN/GLOBULIN RATIO           2.1 (calc)           1.0-2.

5        

 

                    BILIRUBIN, TOTAL           0.6 mg/dL           0.2-1.2      

  

 

                    ALKALINE PHOSPHATASE           51 U/L                 

     

 

                    AST                 20 U/L              10-35        

 

                    ALT                 18 U/L              6-29        

 

                                        A1C - 19 08:09         

 

                    HEMOGLOBIN A1c           5.6 % of total Hgb           <5.7  

      

 

                                        CBC w/MANUAL DIFF - 19 09:25      

   

 

                    WHITE BLOOD CELL COUNT           5.3 Thousand/uL           3

.8-10.8        

 

                    RED BLOOD CELL COUNT           4.81 Million/uL           3.8

0-5.10        

 

                    HEMOGLOBIN           14.7 g/dL           11.7-15.5        

 

                    HEMATOCRIT           44.9 %              35.0-45.0        

 

                    MCV                 93.3 fL             80.0-100.0        

 

                    MCH                 30.6 pg             27.0-33.0        

 

                    MCHC                32.7 g/dL           32.0-36.0        

 

                    RDW                 12.9 %              11.0-15.0        

 

                    PLATELET COUNT           213 Thousand/uL           140-400  

      

 

                    MPV                 11.2 fL             7.5-12.5        

 

                    ABSOLUTE NEUTROPHILS           3572 cells/uL           1500-

7800        

 

                    ABSOLUTE MONOCYTES           270 cells/uL           200-950 

       

 

                    ABSOLUTE EOSINOPHILS           53 cells/uL            

       

 

                    ABSOLUTE BASOPHILS           0 cells/uL           0-200     

   

 

                    NEUTROPHILS           67.4 %              NRG        

 

                    LYMPHOCYTES           26.5 %              NRG        

 

                    MONOCYTES           5.1 %               NRG        

 

                    EOSINOPHILS           1.0 %               NRG        

 

                    BASOPHILS           0 %                 NRG        

 

                    ABSOLUTE LYMPHOCYTES           1405 cells/uL           850-3

900        

 

                    PLATELET ESTIMATION           ADEQUATE            ADEQUATE  

      

 

                    CBC MORPHOLOGY                               NORMAL        

 

                                        Blood CBC with ordered manual differenti

al panel - 20 09:55         

 

                          Blood leukocytes automated count (number/volume)      

     5.6 10*3/uL          

                                        4.3-11.0        

 

                          Blood erythrocytes automated count (number/volume)    

       4.72 10*6/uL       

                                        4.35-5.85        

 

                    Venous blood hemoglobin measurement (mass/volume)           

14.5 g/dL           

11.5-16.0        

 

                    Blood hematocrit (volume fraction)           44 %           

     35-52        

 

                    Automated erythrocyte mean corpuscular volume           93 [

[facility]_us]           

80-99        

 

                                        Automated erythrocyte mean corpuscular h

emoglobin (mass per erythrocyte)        

                          31 pg                     25-34        

 

                                        Automated erythrocyte mean corpuscular h

emoglobin concentration measurement 

(mass/volume)             33 g/dL                   32-36        

 

                    Automated erythrocyte distribution width ratio           12.

8 %              10.0-

14.5        

 

                    Automated blood platelet count (count/volume)           199 

10*3/uL           

130-400        

 

                          Automated blood platelet mean volume measurement      

     10.5 [foz_us]        

                                        7.4-10.4        

 

                    Automated blood neutrophils/100 leukocytes           63 %   

             42-75       

 

 

                    Automated blood lymphocytes/100 leukocytes           24 %   

             12-44       

 

 

                    Blood monocytes/100 leukocytes           9 %                

 NRG        

 

                    Automated blood eosinophils/100 leukocytes           4 %    

             0-10        

 

                    Automated blood basophils/100 leukocytes           1 %      

           0-10        

 

                    Blood neutrophils automated count (number/volume)           

3.5 10*3            

1.8-7.8        

 

                    Blood lymphocytes automated count (number/volume)           

1.3 10*3            

1.0-4.0        

 

                    Blood monocytes automated count (number/volume)           0.

4 10*3            

0.0-1.0        

 

                    Automated eosinophil count           0.2 10*3/uL           0

.0-0.3        

 

                    Automated blood basophil count (count/volume)           0.1 

10*3/uL           

0.0-0.1        

 

                    Manual blood segmented neutrophils/100 leukocytes           

60 %                NRG  

      

 

                    Blood band neutrophils/100 leukocytes           2 %         

        NRG        

 

                    Manual blood lymphocytes/100 leukocytes           26 %      

          NRG        

 

                    Manual eosinophils/100 leukocytes in nose           2 %     

            NRG        

 

                    Manual blood basophils/100 leukocytes           1 %         

        NRG        

 

                                        PT panel in platelet poor plasma by coag

ulation assay - 20 09:55         

 

                          Prothrombin time (PT) in platelet poor plasma by coagu

lation assay           

13.3 s                                  12.2-14.7        

 

                          INR in platelet poor plasma or blood by coagulation as

say           1.0         

                                        0.8-1.4        

 

                                        Activated partial thromboplastin time (a

PTT) in platelet poor plasma 

bycoagulation assay - 20 09:55         

 

                                        Activated partial thromboplastin time (a

PTT) in platelet poor plasma 

bycoagulation assay           25 s                      24-35        

 

                                        Comprehensive metabolic panel - 20

 09:55         

 

                          Serum or plasma sodium measurement (moles/volume)     

      140 mmol/L          

                                        135-145        

 

                          Serum or plasma potassium measurement (moles/volume)  

         4.0 mmol/L       

                                        3.6-5.0        

 

                          Serum or plasma chloride measurement (moles/volume)   

        104 mmol/L        

                                                

 

                    Carbon dioxide           25 mmol/L           21-32        

 

                          Serum or plasma anion gap determination (moles/volume)

           11 mmol/L      

                                        5-14        

 

                          Serum or plasma urea nitrogen measurement (mass/volume

)           16 mg/dL      

                                        7-18        

 

                          Serum or plasma creatinine measurement (mass/volume)  

         0.89 mg/dL       

                                        0.60-1.30        

 

                    Serum or plasma urea nitrogen/creatinine mass ratio         

  18                  NRG 

       

 

                                        Serum or plasma creatinine measurement w

ith calculation of estimated glomerular 

filtration rate           >                         NRG        

 

                    Serum or plasma glucose measurement (mass/volume)           

104 mg/dL           

        

 

                    Serum or plasma calcium measurement (mass/volume)           

9.4 mg/dL           

8.5-10.1        

 

                          Serum or plasma total bilirubin measurement (mass/volu

me)           0.4 mg/dL   

                                        0.1-1.0        

 

                                        Serum or plasma alkaline phosphatase isac

surement (enzymatic activity/volume)    

                          68 U/L                            

 

                                        Serum or plasma aspartate aminotransfera

se measurement (enzymatic 

activity/volume)           22 U/L                    5-34        

 

                                        Serum or plasma alanine aminotransferase

 measurement (enzymatic activity/volume)

                          18 U/L                    0-55        

 

                    Serum or plasma protein measurement (mass/volume)           

6.2 g/dL            

6.4-8.2        

 

                    Serum or plasma albumin measurement (mass/volume)           

4.2 g/dL            

3.2-4.5        

 

                    CALCIUM CORRECTED           9.2 mg/dL           8.5-10.1    

    

 

                                        Complete urinalysis with reflex to cultu

re - 20 10:56         

 

                    Urine color determination           YELLOW              NRG 

       

 

                    Urine clarity determination           SLT CLOUDY            

NRG        

 

                    Urine pH measurement by test strip           7.0            

     5-9        

 

                    Specific gravity of urine by test strip           1.015     

          1.016-1.022  

      

 

                          Urine protein assay by test strip, semi-quantitative  

         NEGATIVE         

                                        NEGATIVE        

 

                    Urine glucose detection by automated test strip           NE

GATIVE            

NEGATIVE        

 

                          Erythrocytes detection in urine sediment by light micr

oscopy           NEGATIVE 

                                        NEGATIVE        

 

                    Urine ketones detection by automated test strip           NE

GATIVE            

NEGATIVE        

 

                    Urine nitrite detection by test strip           NEGATIVE    

        NEGATIVE    

    

 

                    Urine total bilirubin detection by test strip           NEGA

TIVE            

NEGATIVE        

 

                          Urine urobilinogen measurement by automated test strip

 (mass/volume)           

0.2 mg/dL                               < = 1.0        

 

                    Urine leukocyte esterase detection by dipstick           1+ 

                 NEGATIVE 

       

 

                                        Automated urine sediment erythrocyte cou

nt by microscopy (number/high power 

field)                    NONE                      NRG        

 

                                        Automated urine sediment leukocyte count

 by microscopy (number/high power field)

                           [HPF]                    NRG        

 

                          Bacteria detection in urine sediment by light microsco

py           NEGATIVE     

                                        NRG        

 

                                        Squamous epithelial cells detection in u

rine sediment by light microscopy       

                          0-2                       NRG        

 

                          Crystals detection in urine sediment by light microsco

py           NONE         

                                        NRG        

 

                    Casts detection in urine sediment by light microscopy       

    NONE                

NRG        

 

                          Mucus detection in urine sediment by light microscopy 

          SMALL           

                                        NRG        

 

                    Complete urinalysis with reflex to culture           YES    

             NRG        

 

                                        Bacterial urine culture - 20 10:56

         

 

                    Bacterial urine culture           SEE COMMEN            NRG 

       

 

                    COLONY COUNT           .                   NRG        

 

                    FREE TEXT ENTRY 2           PRELIM RAPID ID TEST AT [facility] 

 8:05            NRG

        

 

                                        Complete blood count (CBC) with automate

d white blood cell (WBC) differential - 

20 05:42         

 

                          Blood leukocytes automated count (number/volume)      

     5.2 10*3/uL          

                                        4.3-11.0        

 

                          Blood erythrocytes automated count (number/volume)    

       4.58 10*6/uL       

                                        4.35-5.85        

 

                    Venous blood hemoglobin measurement (mass/volume)           

14.0 g/dL           

11.5-16.0        

 

                    Blood hematocrit (volume fraction)           43 %           

     35-52        

 

                    Automated erythrocyte mean corpuscular volume           93 [

foz_us]           

80-99        

 

                                        Automated erythrocyte mean corpuscular h

emoglobin (mass per erythrocyte)        

                          31 pg                     25-34        

 

                                        Automated erythrocyte mean corpuscular h

emoglobin concentration measurement 

(mass/volume)             33 g/dL                   32-36        

 

                    Automated erythrocyte distribution width ratio           13.

4 %              10.0-

14.5        

 

                    Automated blood platelet count (count/volume)           186 

10*3/uL           

130-400        

 

                          Automated blood platelet mean volume measurement      

     11.3 [foz_us]        

                                        7.4-10.4        

 

                    Automated blood neutrophils/100 leukocytes           55 %   

             42-75       

 

 

                    Automated blood lymphocytes/100 leukocytes           30 %   

             12-44       

 

 

                    Blood monocytes/100 leukocytes           10 %               

 0-12        

 

                    Automated blood eosinophils/100 leukocytes           4 %    

             0-10        

 

                    Automated blood basophils/100 leukocytes           1 %      

           0-10        

 

                    Blood neutrophils automated count (number/volume)           

2.9 10*3            

1.8-7.8        

 

                    Blood lymphocytes automated count (number/volume)           

1.6 10*3            

1.0-4.0        

 

                    Blood monocytes automated count (number/volume)           0.

5 10*3            

0.0-1.0        

 

                    Automated eosinophil count           0.2 10*3/uL           0

.0-0.3        

 

                    Automated blood basophil count (count/volume)           0.0 

10*3/uL           

0.0-0.1        

 

                                        Comprehensive metabolic panel - 20

 05:49         

 

                          Serum or plasma sodium measurement (moles/volume)     

      141 mmol/L          

                                        135-145        

 

                          Serum or plasma potassium measurement (moles/volume)  

         3.7 mmol/L       

                                        3.6-5.0        

 

                          Serum or plasma chloride measurement (moles/volume)   

        108 mmol/L        

                                                

 

                    Carbon dioxide           22 mmol/L           21-32        

 

                          Serum or plasma anion gap determination (moles/volume)

           11 mmol/L      

                                        5-14        

 

                          Serum or plasma urea nitrogen measurement (mass/volume

)           15 mg/dL      

                                        7-18        

 

                          Serum or plasma creatinine measurement (mass/volume)  

         0.90 mg/dL       

                                        0.60-1.30        

 

                    Serum or plasma urea nitrogen/creatinine mass ratio         

  17                  NRG 

       

 

                                        Serum or plasma creatinine measurement w

ith calculation of estimated glomerular 

filtration rate           60                        NRG        

 

                    Serum or plasma glucose measurement (mass/volume)           

84 mg/dL            

        

 

                    Serum or plasma calcium measurement (mass/volume)           

9.2 mg/dL           

8.5-10.1        

 

                          Serum or plasma total bilirubin measurement (mass/volu

me)           0.5 mg/dL   

                                        0.1-1.0        

 

                                        Serum or plasma alkaline phosphatase isac

surement (enzymatic activity/volume)    

                          60 U/L                            

 

                                        Serum or plasma aspartate aminotransfera

se measurement (enzymatic 

activity/volume)           21 U/L                    5-34        

 

                                        Serum or plasma alanine aminotransferase

 measurement (enzymatic activity/volume)

                          19 U/L                    0-55        

 

                    Serum or plasma protein measurement (mass/volume)           

6.1 g/dL            

6.4-8.2        

 

                    Serum or plasma albumin measurement (mass/volume)           

3.9 g/dL            

3.2-4.5        

 

                    CALCIUM CORRECTED           9.3 mg/dL           8.5-10.1    

    

 

                                        Lipid 1996 panel - 20 05:49       

  

 

                          Serum or plasma triglyceride measurement (mass/volume)

           96 mg/dL       

                                        <150        

 

                          Serum or plasma cholesterol measurement (mass/volume) 

          223 mg/dL       

                                        < 200        

 

                          Serum or plasma cholesterol in HDL measurement (mass/v

olume)           62 mg/dL 

                                        40-60        

 

                          Cholesterol in LDL [mass/volume] in serum or plasma by

 direct assay           

149 mg/dL                               1-129        

 

                          Serum or plasma cholesterol in VLDL measurement (mass/

volume)           19 mg/dL

                                        5-40        

 

                                        Comprehensive metabolic panel - 20

 13:05         

 

                          Serum or plasma sodium measurement (moles/volume)     

      142 mmol/L          

                                        135-145        

 

                          Serum or plasma potassium measurement (moles/volume)  

         4.1 mmol/L       

                                        3.6-5.0        

 

                          Serum or plasma chloride measurement (moles/volume)   

        103 mmol/L        

                                                

 

                    Carbon dioxide           25 mmol/L           21-32        

 

                          Serum or plasma anion gap determination (moles/volume)

           14 mmol/L      

                                        5-14        

 

                          Serum or plasma urea nitrogen measurement (mass/volume

)           16 mg/dL      

                                        7-18        

 

                          Serum or plasma creatinine measurement (mass/volume)  

         0.94 mg/dL       

                                        0.60-1.30        

 

                    Serum or plasma urea nitrogen/creatinine mass ratio         

  17                  NRG 

       

 

                                        Serum or plasma creatinine measurement w

ith calculation of estimated glomerular 

filtration rate           57                        NRG        

 

                    Serum or plasma glucose measurement (mass/volume)           

112 mg/dL           

        

 

                          Serum or plasma calcium measurement (mass/volume)     

      10.0 mg/dL          

                                        8.5-10.1        

 

                          Serum or plasma total bilirubin measurement (mass/volu

me)           0.3 mg/dL   

                                        0.1-1.0        

 

                                        Serum or plasma alkaline phosphatase isac

surement (enzymatic activity/volume)    

                          70 U/L                            

 

                                        Serum or plasma aspartate aminotransfera

se measurement (enzymatic 

activity/volume)           33 U/L                    5-34        

 

                                        Serum or plasma alanine aminotransferase

 measurement (enzymatic activity/volume)

                          32 U/L                    0-55        

 

                    Serum or plasma protein measurement (mass/volume)           

6.9 g/dL            

6.4-8.2        

 

                    Serum or plasma albumin measurement (mass/volume)           

4.4 g/dL            

3.2-4.5        

 

                    CALCIUM CORRECTED           9.7 mg/dL           8.5-10.1    

    

 

                                        TROPONIN I FS - 20 13:05         

 

                    TROPONIN I FS           < 0.30              <0.30        

 

                                        Complete blood count (CBC) with automate

d white blood cell (WBC) differential - 

20 13:05         

 

                          Blood leukocytes automated count (number/volume)      

     6.5 10*3/uL          

                                        4.3-11.0        

 

                          Blood erythrocytes automated count (number/volume)    

       4.91 10*6/uL       

                                        4.35-5.85        

 

                    Venous blood hemoglobin measurement (mass/volume)           

15.0 g/dL           

11.5-16.0        

 

                    Blood hematocrit (volume fraction)           46 %           

     35-52        

 

                    Automated erythrocyte mean corpuscular volume           93 [

foz_us]           

80-99        

 

                                        Automated erythrocyte mean corpuscular h

emoglobin (mass per erythrocyte)        

                          31 pg                     25-34        

 

                                        Automated erythrocyte mean corpuscular h

emoglobin concentration measurement 

(mass/volume)             33 g/dL                   32-36        

 

                    Automated erythrocyte distribution width ratio           12.

7 %              10.0-

14.5        

 

                    Automated blood platelet count (count/volume)           198 

10*3/uL           

130-400        

 

                          Automated blood platelet mean volume measurement      

     10.4 [foz_us]        

                                        7.4-10.4        

 

                    Automated blood neutrophils/100 leukocytes           67 %   

             42-75       

 

 

                    Automated blood lymphocytes/100 leukocytes           23 %   

             12-44       

 

 

                    Blood monocytes/100 leukocytes           7 %                

 0-12        

 

                    Automated blood eosinophils/100 leukocytes           2 %    

             0-10        

 

                    Automated blood basophils/100 leukocytes           1 %      

           0-10        

 

                    Blood neutrophils automated count (number/volume)           

4.4 10*3            

1.8-7.8        

 

                    Blood lymphocytes automated count (number/volume)           

1.5 10*3            

1.0-4.0        

 

                    Blood monocytes automated count (number/volume)           0.

5 10*3            

0.0-1.0        

 

                    Automated eosinophil count           0.1 10*3/uL           0

.0-0.3        

 

                    Automated blood basophil count (count/volume)           0.1 

10*3/uL           

0.0-0.1        

 

                                        PT panel in platelet poor plasma by coag

ulation assay - 20 13:05         

 

                          Prothrombin time (PT) in platelet poor plasma by coagu

lation assay           

12.7 s                                  12.2-14.7        

 

                          INR in platelet poor plasma or blood by coagulation as

say           0.9         

                                        0.8-1.4        

 

                                        Activated partial thromboplastin time (a

PTT) in platelet poor plasma 

bycoagulation assay - 20 13:05         

 

                                        Activated partial thromboplastin time (a

PTT) in platelet poor plasma 

bycoagulation assay           25 s                      24-35        

 

                                        Fibrin D-dimer FEU measurement in platel

et poor plasma (mass/volume) - 20 

13:05         

 

                          Fibrin D-dimer FEU measurement in platelet poor plasma

 (mass/volume)           

0.85 ug/mL                              0.00-0.49        

 

                                        Capillary blood glucose measurement by g

lucometer (mass/volume) - 20 13:30

         

 

                          Capillary blood glucose measurement by glucometer (mas

s/volume)           101 

mg/dL                                           



                                                                              



Encounters

      



                ACCT No.           Visit Date/Time           Discharge          

 Status         

             Pt. Type           Provider           Facility           Loc./Unit 

          

Complaint        

 

                [id number]           2019 10:15:00           2019 23:59:

59           CLS

                [facility]                                           [facility]  

                                                 

 

             [id number]           2019 09:30:00                              

       Document

 Registration                                                                   

 

 

             [id number]           2019 08:15:00                              

       Document

 Registration                                                                   

 

 

                    [id number]           2020 13:25:00            00:01:00        

                DIS             Outpatient           HÉCTOR HAQUE, CONG WINN        

   [facility]           CARD                      TIA,CVA        

 

                    [id number]           2020 12:56:00            14:25:00        

                DIS             Emergency           ROMY HAQUE, KAT CORTES          

 [facility]           ER FS                     BLURRY VISION,THINKS SH

E MAY BE 

HAVING A STROKE        

 

                    [id number]           2020 13:21:00            11:41:00        

                DIS             Outpatient           VAIBHAV VELA DO           

[facility]           4TH                       TIA        

 

                    [id number]           2019 12:59:00            13:58:00        

                DIS             Emergency           YAHIR OLIVER DO         

  [facility]           ER FS                     RT FOOT INJ        

 

                    [id number]           2019 16:27:00            18:50:00        

                DIS             Emergency           IGGY HAQUE, WILDA Llamas

[facility]           ER FS                     DIZZY        

 

                    [id number]           2019 15:36:00            17:46:00        

                DIS             Emergency           CALLI NIETO         

  [facility]           ER                        POSS TIA/BLURRY VISION

## 2020-04-26 VITALS — DIASTOLIC BLOOD PRESSURE: 71 MMHG | SYSTOLIC BLOOD PRESSURE: 143 MMHG

## 2020-04-26 LAB
APTT BLD: 26 SEC (ref 24–35)
D DIMER PPP FEU-MCNC: 0.66 UG/ML (ref 0–0.49)
INR PPP: 1 (ref 0.8–1.4)
PROTHROMBIN TIME: 13.5 SEC (ref 12.2–14.7)

## 2020-04-26 NOTE — DIAGNOSTIC IMAGING REPORT
PROCEDURE: CT angiography of the head and CT angiography of the

neck with and without contrast.



TECHNIQUE: Contiguous noncontrast images were obtained from the

skull base through the vertex. After intravenous contrast

administration, helical CT angiography of the neck was performed.

Source data was reformatted into 3D MIP projections. Delayed post

contrast acquisition was also obtained. Auto Exposure Controls

were utilized during the CT exam to meet ALARA standards for

radiation dose reduction. 



INDICATION:  Blurred vision right eye. Difficulty communicating.



FINDINGS: There is suboptimal opacification of the carotid and

vertebral arteries. There is normal takeoff. There are scattered

atherosclerotic changes. Maximal stenosis is noted in the right

internal carotid artery just distal to the bifurcation with

stenosis estimated 30-50%. No hemodynamic changes are seen along

the cervical region. Both vertebral arteries supply the basilar

artery with right vertebral artery being dominant. The internal

carotid arteries and the carotid siphon shows dense calcified

plaquing bilaterally with stenosis estimated approximately 50%

within the carotid siphon. The middle cerebral arteries show mild

atherosclerotic changes. No evidence of occlusive disease or

spasm. The anterior middle and posterior cerebral arteries

otherwise widely patent. Cerebellar arteries are patent. No

intracranial hemorrhage or mass effect. Ventricles are not

dilated.



There is advanced degenerative cervical disc and facet disease

with mild levoscoliosis cervical spine. This is causing moderate

stenosis at the C5-C6 level.



IMPRESSION: CT angiography showing diffuse scattered

atherosclerotic plaquing as described. No hemodynamic changes are

demonstrated. Maximal stenosis estimated between 30 and 50%

within the right internal carotid artery in the neck. Maximal

stenosis intracranially estimated approximately 50% within the

internal carotid arteries in the carotid siphon bilaterally.



These findings are concordant with the preliminary report.



Dictated by: 



  Dictated on workstation # [id number]

## 2020-04-26 NOTE — DIAGNOSTIC IMAGING REPORT
PROCEDURE: CT head wo r/o stroke.



TECHNIQUE: Multiple contiguous axial images were obtained through

the brain without the use of intravenous contrast. Auto Exposure

Controls were utilized during the CT exam to meet ALARA standards

for radiation dose reduction. 



INDICATION: Blurred vision right eye. Difficulty communicating.

Confusion.



Comparison with 01/19/2020.



FINDINGS: The ventricles and cortical gyral pattern are normal.

There is no intracranial hemorrhage or mass effect. There are

periventricular white matter changes present bilaterally. Basal

cisterns are clear. CP angles are normal. Mastoid air cells are

well-aerated and clear.



IMPRESSION: No acute abnormalities when compared with previous

examination of 01/19/2020.



These findings are concordant with the preliminary report.



Dictated by: 



  Dictated on workstation # [id number]

## 2020-04-26 NOTE — DIAGNOSTIC IMAGING REPORT
INDICATION: Stroke.



Comparison with 01/19/2020.



FINDINGS: Portable chest. The lungs are well-aerated. There are

no acute infiltrates. Heart is not enlarged. No pulmonary edema

or hilar adenopathy. No pneumothorax or pleural effusion. No bony

abnormalities.



IMPRESSION: No acute changes when compared with previous exam.



Dictated by: 



  Dictated on workstation # [id number]

## 2020-07-07 ENCOUNTER — HOSPITAL ENCOUNTER (OUTPATIENT)
Dept: HOSPITAL 75 - RAD | Age: 80
End: 2020-07-07
Attending: FAMILY MEDICINE
Payer: MEDICARE

## 2020-07-07 DIAGNOSIS — Z78.0: ICD-10-CM

## 2020-07-07 DIAGNOSIS — M85.88: ICD-10-CM

## 2020-07-07 DIAGNOSIS — Z13.820: Primary | ICD-10-CM

## 2020-07-07 PROCEDURE — 77080 DXA BONE DENSITY AXIAL: CPT

## 2020-07-07 NOTE — DIAGNOSTIC IMAGING REPORT
INDICATION: 79-year-old female, postmenopausal. Screening for

osteoporosis.



COMPARISON: None.



FINDINGS:



AP Spine L1-L4:  

[BMD (g/cm2): 0.986] [T-Score: -1.8] [Z-Score: 0.1]

[BMD Previous: na] [BMD % Change: na]



LT Hip Neck:       

[BMD (g/cm2): 0.747] [T-Score: -2.1] [Z-Score: 0.2]



LT Hip Total:       

[BMD (g/cm2):0.730] [T-Score:-2.2] [Z-Score: -0.1]

[BMD Previous: na] [BMD % Change: na]



RT Hip Neck:      

[BMD (g/cm2):0.754] [T-Score:-2.0] [Z-Score:0.2]



RT Hip Total:      

[BMD (g/cm2):0.715] [T-score:-2.3] [Z-Score:-0.2]

[BMD Previous:na] [BMD % Change:na]



*Indicates significant change from prior examination based on 95%

confidence level.



World Health Organization criteria for BMD interpretation

classify patients as Normal (T-score at or above -1.0),

Osteopenic (T-score between -1.0 and -2.5) or Osteoporotic

(T-score at or below -2.5).



LIMITATIONS AND MODIFICATION:  None.



FRACTURE RISK (FRAX SCORE):

The ten year probability of (%): 

Major Osteoporotic Fracture: [16.7]

Hip Fracture: [5.3]



IMPRESSION:

1. Osteopenia (Low bone mass).

2. Baseline examination.

3. See below National Osteoporosis Foundation guidelines on when

to potentially initiate pharmacologic therapy. 



Based on the National Osteoporosis Foundation Guidelines,

pharmacologic treatment should be initiated in any of the

following, unless clinical conditions suggest otherwise:



*  Any patient with prior fragility fracture of the hip or

vertebrae. A spine fracture indicates 5X risk for subsequent

spine fracture and 2X risk for subsequent hip fracture.



*  Osteoporosis (T-score <-2.5).



*  Postmenopausal women and men age 50 and older with low bone

mass/osteopenia (T-score between -1.0 and -2.5) by DXA and

10-year major osteoporotic fracture greater than 20% or a 10-year

probability of hip fracture greater than 3%. These fracture risks

are supplied above in the FRAX score, if applicable.



*  Clinician judgement and/or patient preferences may indicate

treatment for people with 10-year fracture probabilities above or

below these levels.



Dictated by: 



  Dictated on workstation # [id number]

## 2020-08-15 ENCOUNTER — HOSPITAL ENCOUNTER (EMERGENCY)
Dept: HOSPITAL 75 - ER FS | Age: 80
Discharge: HOME | End: 2020-08-15
Payer: MEDICARE

## 2020-08-15 VITALS — SYSTOLIC BLOOD PRESSURE: 146 MMHG | DIASTOLIC BLOOD PRESSURE: 68 MMHG

## 2020-08-15 VITALS — BODY MASS INDEX: 26.74 KG/M2 | WEIGHT: 145.28 LBS | HEIGHT: 61.97 IN

## 2020-08-15 DIAGNOSIS — Z79.82: ICD-10-CM

## 2020-08-15 DIAGNOSIS — Z86.73: ICD-10-CM

## 2020-08-15 DIAGNOSIS — I10: ICD-10-CM

## 2020-08-15 DIAGNOSIS — Z88.8: ICD-10-CM

## 2020-08-15 DIAGNOSIS — Z91.14: ICD-10-CM

## 2020-08-15 DIAGNOSIS — I67.4: Primary | ICD-10-CM

## 2020-08-15 DIAGNOSIS — Z88.0: ICD-10-CM

## 2020-08-15 LAB
ALBUMIN SERPL-MCNC: 3.7 GM/DL (ref 3.2–4.5)
ALP SERPL-CCNC: 71 U/L (ref 40–136)
ALT SERPL-CCNC: 15 U/L (ref 0–55)
BASOPHILS # BLD AUTO: 0 10^3/UL (ref 0–0.1)
BASOPHILS NFR BLD AUTO: 1 % (ref 0–10)
BILIRUB SERPL-MCNC: 0.3 MG/DL (ref 0.1–1)
BUN/CREAT SERPL: 13
CALCIUM SERPL-MCNC: 9 MG/DL (ref 8.5–10.1)
CHLORIDE SERPL-SCNC: 104 MMOL/L (ref 98–107)
CO2 SERPL-SCNC: 24 MMOL/L (ref 21–32)
CREAT SERPL-MCNC: 0.88 MG/DL (ref 0.6–1.3)
EOSINOPHIL # BLD AUTO: 0.1 10^3/UL (ref 0–0.3)
EOSINOPHIL NFR BLD AUTO: 3 % (ref 0–10)
ERYTHROCYTE [DISTWIDTH] IN BLOOD BY AUTOMATED COUNT: 12.5 % (ref 10–14.5)
GFR SERPLBLD BASED ON 1.73 SQ M-ARVRAT: > 60 ML/MIN
GLUCOSE SERPL-MCNC: 102 MG/DL (ref 70–105)
HCT VFR BLD CALC: 40 % (ref 35–52)
HGB BLD-MCNC: 13.3 G/DL (ref 11.5–16)
LYMPHOCYTES # BLD AUTO: 0.9 X 10^3 (ref 1–4)
LYMPHOCYTES NFR BLD AUTO: 21 % (ref 12–44)
MANUAL DIFFERENTIAL PERFORMED BLD QL: NO
MCH RBC QN AUTO: 31 PG (ref 25–34)
MCHC RBC AUTO-ENTMCNC: 33 G/DL (ref 32–36)
MCV RBC AUTO: 93 FL (ref 80–99)
MONOCYTES # BLD AUTO: 0.4 X 10^3 (ref 0–1)
MONOCYTES NFR BLD AUTO: 9 % (ref 0–12)
NEUTROPHILS # BLD AUTO: 2.8 X 10^3 (ref 1.8–7.8)
NEUTROPHILS NFR BLD AUTO: 66 % (ref 42–75)
PLATELET # BLD: 186 10^3/UL (ref 130–400)
PMV BLD AUTO: 10.6 FL (ref 7.4–10.4)
POTASSIUM SERPL-SCNC: 4.2 MMOL/L (ref 3.6–5)
PROT SERPL-MCNC: 6 GM/DL (ref 6.4–8.2)
SODIUM SERPL-SCNC: 136 MMOL/L (ref 135–145)
WBC # BLD AUTO: 4.3 10^3/UL (ref 4.3–11)

## 2020-08-15 PROCEDURE — 80053 COMPREHEN METABOLIC PANEL: CPT

## 2020-08-15 PROCEDURE — 85025 COMPLETE CBC W/AUTO DIFF WBC: CPT

## 2020-08-15 PROCEDURE — 36415 COLL VENOUS BLD VENIPUNCTURE: CPT

## 2020-08-15 NOTE — ED EENT
History of Present Illness


General


Chief Complaint:  Cardiac/General Problems


Stated Complaint:  FEELS LIKE BP IS HIGH;BLURRY VISION


Source:  patient


Exam Limitations:  no limitations





History of Present Illness


Date Seen by Provider:  Aug 15, 2020


Time Seen by Provider:  08:57


Initial Comments


Patient arrives ER by private conveyance with chief complaint that she was 

cooking breakfast about 30 minutes ago when she noticed both her eyes became 

blurry. She thought that this might of been assigned her blood pressure was 

elevated. She has a history of TIAs but no strokes. She supposed to be on Plavix

but does not want to take it because of a history of bleeding ulcers. She had a 

colonoscopy and EGD by Dr. So 2 years ago and was told to follow-up in 10 

years. She's not having any weakness, slurring or double vision. Her blurred 

vision was transient for about 15 minutes and went away. No pain in her eyes. 

She has been using her eyedrops for her glaucoma as well as taking her 

metoprolol 25 mg twice a day as prescribed. She follows with Dr.Self and 

recently had labs that she was told everything was okay. She has no known recent

history of anemia. She's not having any black tarry stools. Because of the 

blurry vision she was concerned she might be having the onset of a TIA so she 

decided to come to the ER and have her blood pressure checked. Shortly after she

had the symptoms started she took her metoprolol. She does remark that Dr. Davila 

wanted to consider raising her metoprolol but he was afraid that she might have 

problems tolerating the higher dose. She did take 81 mg aspirin this morning.





Allergies and Home Medications


Allergies


Coded Allergies:  


     Penicillins (Unverified  Allergy, Unknown, 1/16/20)


     Statins-Hmg-Coa Reductase Inhibitor (Verified  Allergy, Unknown, 1/17/20)


   


   per patient report


     diphenhydramine (Verified  Allergy, Unknown, 6/26/19)





Home Medications


Ascorbate Calcium 500 Mg Tablet, 500 MG PO DAILY, (Reported)


Aspirin 81 Mg Tablet., 81 MG PO DAILY


   Prescribed by: VAIBHAV VELA on 1/17/20 5642


Cholecalciferol (Vitamin D3) 2,000 Unit Tablet, 2,000 UNIT PO DAILY, (Reported)


Latanoprost 2.5 Ml Drops, 1 DROP OU HS, (Reported)


Meclizine HCl 25 Mg Tablet, 25 MG PO TID PRN for DIZZINESS, (Reported)


Metoprolol Tartrate 25 Mg Tablet, 25 MG PO BID, (Reported)


Multivitamin 1 Each Tablet, 1 TAB PO DAILY, (Reported)





Patient Home Medication List


Home Medication List Reviewed:  Yes





Review of Systems


Review of Systems


Constitutional:  No chills, No diaphoresis, No fever, No malaise, No weakness


Eyes:  See HPI; Denies Blindness; Blurred Vision; Denies Drainage, Denies 

Decreased Acuity


Ears:  Denies Dizziness


Nose:  denies clots, denies congestion


Mouth:  denies clots, denies loose teeth


Throat:  denies pain, denies swelling


Respiratory:  No cough, No short of breath


Cardiovascular:  No edema, No Hx of Intervention


Gastrointestinal:  No abdominal pain, No nausea, No vomiting


Musculoskeletal:  No back pain, No joint pain


Hematologic/Lymphatic:  Denies Anemia, Denies Blood Clots, Denies Easy Bleeding,

Denies Easy Bruising





All Other Systems Reviewed


Negative Unless Noted:  Yes





Past Medical-Social-Family Hx


Patient Social History


Alcohol Use:  Denies Use


Recreational Drug Use:  No


Smoking Status:  Never a Smoker


2nd Hand Smoke Exposure:  No


Recent Hopitalizations:  No


Physical Abuse:  No


Sexual Abuse:  No


Mistreated:  No


Fear:  No





Immunizations Up To Date


Date of Pneumonia Vaccine:  Oct 1, 2018





Seasonal Allergies


Seasonal Allergies:  No





Past Medical History


Surgeries:  Yes (HERNIA)


Tubal Ligation


Respiratory:  No


Cardiac:  Yes


Hypertension


Neurological:  Yes


TIA


GYN History:  Tubal Ligation


Genitourinary:  No


Gastrointestinal:  No


Musculoskeletal:  No


Endocrine:  No


HEENT:  No


Glaucoma


Cancer:  No


Psychosocial:  No


Integumentary:  No


Blood Disorders:  No


Adverse Reaction/Blood Tranf:  No





Physical Exam


Vital Signs





Vital Signs - First Documented








 8/15/20





 08:55


 


Temp 36.2


 


Pulse 85


 


Resp 18


 


B/P (MAP) 182/74 (110)


 


Pulse Ox 97


 


O2 Delivery Room Air








Height, Weight, BMI


Height: 5'2.00"


Weight: 142lbs. oz. 64.177615hs; 25.00 BMI


Method:Stated


General Appearance:  WD/WN, no apparent distress


Eyes:  bilateral eye normal inspection, bilateral eye PERRL, bilateral eye EOMI,

bilateral eye other (right eye 23, 24 mmHg tonometry; left eye 21, 19 mmHg 

tonometry)


Ears:  bilateral ear auricle normal, bilateral ear canal normal


Nose:  normal inspection; No discharge


Mouth/Throat:  normal mouth inspection, pharynx normal


Cardiovascular:  normal peripheral pulses, regular rate, rhythm


Respiratory:  no respiratory distress, no accessory muscle use


Neurologic/Psychiatric:  CNs II-XII nml as tested, no motor/sensory deficits, 

alert, normal mood/affect, oriented x 3


Skin:  normal color, warm/dry





Progress/Results/Core Measures


Results/Orders


Lab Results





Laboratory Tests








Test


 8/15/20


09:30 Range/Units


 


 


White Blood Count


 4.3 


 4.3-11.0


10^3/uL


 


Red Blood Count


 4.28 L


 4.35-5.85


10^6/uL


 


Hemoglobin 13.3  11.5-16.0  G/DL


 


Hematocrit 40  35-52  %


 


Mean Corpuscular Volume 93  80-99  FL


 


Mean Corpuscular Hemoglobin 31  25-34  PG


 


Mean Corpuscular Hemoglobin


Concent 33 


 32-36  G/DL





 


Red Cell Distribution Width 12.5  10.0-14.5  %


 


Platelet Count


 186 


 130-400


10^3/uL


 


Mean Platelet Volume 10.6 H 7.4-10.4  FL


 


Neutrophils (%) (Auto) 66  42-75  %


 


Lymphocytes (%) (Auto) 21  12-44  %


 


Monocytes (%) (Auto) 9  0-12  %


 


Eosinophils (%) (Auto) 3  0-10  %


 


Basophils (%) (Auto) 1  0-10  %


 


Neutrophils # (Auto) 2.8  1.8-7.8  X 10^3


 


Lymphocytes # (Auto) 0.9 L 1.0-4.0  X 10^3


 


Monocytes # (Auto) 0.4  0.0-1.0  X 10^3


 


Eosinophils # (Auto)


 0.1 


 0.0-0.3


10^3/uL


 


Basophils # (Auto)


 0.0 


 0.0-0.1


10^3/uL


 


Sodium Level 136  135-145  MMOL/L


 


Potassium Level 4.2  3.6-5.0  MMOL/L


 


Chloride Level 104    MMOL/L


 


Carbon Dioxide Level 24  21-32  MMOL/L


 


Anion Gap 8  5-14  MMOL/L


 


Blood Urea Nitrogen 11  7-18  MG/DL


 


Creatinine


 0.88 


 0.60-1.30


MG/DL


 


Estimat Glomerular Filtration


Rate > 60 


  





 


BUN/Creatinine Ratio 13   


 


Glucose Level 102    MG/DL


 


Calcium Level 9.0  8.5-10.1  MG/DL


 


Corrected Calcium 9.2  8.5-10.1  MG/DL


 


Total Bilirubin 0.3  0.1-1.0  MG/DL


 


Aspartate Amino Transf


(AST/SGOT) 20 


 5-34  U/L





 


Alanine Aminotransferase


(ALT/SGPT) 15 


 0-55  U/L





 


Alkaline Phosphatase 71    U/L


 


Total Protein 6.0 L 6.4-8.2  GM/DL


 


Albumin 3.7  3.2-4.5  GM/DL








My Orders





Orders - MALORIE MORENO


Tetracaine  0.5% Ophth Sol Sdv (Tetracai (8/15/20 09:15)


Cbc With Automated Diff (8/15/20 09:26)


Comprehensive Metabolic Panel (8/15/20 09:26)





Medications Given in ED





Current Medications








 Medications  Dose


 Ordered  Sig/Juliann


 Route  Start Time


 Stop Time Status Last Admin


Dose Admin


 


 Tetracaine HCl  4 ml  ONCE  ONCE


 OU  8/15/20 09:15


 8/15/20 09:16 DC 8/15/20 09:10


4 ML








Vital Signs/I&O











 8/15/20





 08:55


 


Temp 36.2


 


Pulse 85


 


Resp 18


 


B/P (MAP) 182/74 (110)


 


Pulse Ox 97


 


O2 Delivery Room Air











Progress


Progress Note #1:  


   Time:  09:23


Progress Note


The patient presents for a 15 minute, transient blurry vision and suspicion that

her blood pressure is elevated. Her blood pressure was 180 systolic when she 

arrived and 15 minutes later it was down to 149/59. She is presently 

neurologically intact despite a history of TIAs. We did tonometry given the 

history of glaucoma. Tonometry was not particularly concerning 24 on the right 

and 21 on the left. She says she is using her eyedrops. Plan is to watch her for

a short amount of time and reexamine her. She still neurologically intact we'll 

let her follow-up in the next couple weeks with Dr. self. We'll decide whether 

she would benefit from a low-dose of amlodipine added to her regimen. Plan to 

obtain basic labs to rule out anemia or kidney failure. Suspect she is having 

transient hypertensive encephalopathy.


Progress Note #2:  


   Time:  10:14


Progress Note


The labs are unremarkable. The patient still has a normal neurologic exam and 

has had no material deterioration during her ER stay. Her blood pressure is now 

down below 140 systolic. We will not be adding any blood pressure medicine 

rather just encouraging her to take her metoprolol and follow-up with Dr. Davila. 

Return precautions were given.





Departure


Impression





   Primary Impression:  


   Hypertensive encephalopathy syndrome


Disposition:  01 HOME, SELF-CARE


Condition:  Stable





Departure-Patient Inst.


Decision time for Depature:  10:10


Referrals:  


REBEKA DAVILA MD (PCP/Family)


Primary Care Physician


Patient Instructions:  High Blood Pressure (DC)





Add. Discharge Instructions:  


I suspect your symptoms were related to your high blood pressure today. Make 

sure you're taking your blood pressure medicine as prescribed.


Follow-up with Dr. Davila in the next 1-2 weeks to discuss your blood pressure and

symptoms.


Return to the ER if you have any new or worsening symptoms such as slurred 

speech, facial droop, weakness, shortness of breath or chest pain.


I would encourage you to start taking the Plavix as prescribed to help prevent 

further TIAs.


All discharge instructions reviewed with patient and/or family. Voiced 

understanding.











MALORIE MORENO                 Aug 15, 2020 09:10

## 2020-08-15 NOTE — XMS REPORT
Continuity of Care Document

                             Created on: 08/15/2020



Kathia Camargo

External Reference #: [id number]

: 1940

Sex: Female



Demographics





                          Address                   [facility] [place]  [address]

 

                          Home Phone                [phone] x

 

                          Preferred Language        Unknown

 

                          Marital Status            Unknown

 

                          Jehovah's witness Affiliation     Unknown

 

                          Race                      Unknown

 

                          Ethnic Group              Unknown





Author





                          Author                    [facility]

 

                          Organization              [facility] [employer] [facility]

 

                          Address                   Unknown

 

                          Phone                     Unavailable



              



Allergies

      



             Active           Description           Code           Type         

  Severity   

                Reaction           Onset           Reported/Identified          

 

Relationship to Patient                 Clinical Status        

 

                Yes             diphenhydramine           [id number]           D

rug Allergy       

           Unknown           N/A                       2019               

        

        

 

             Yes           PENICILLIN           PENICILLIN                      

  Unknown     

             N/A                        2019                              

   

 

             Yes           Penicillins           [id number]           Drug Aller

gy           

Unknown           N/A                        2020                         

  

     

 

                    Yes                 Statins-Hmg-Coa Reductase Inhibitor     

      [id number]          

             Drug Allergy           Unknown           N/A                       

 2020   

                                                             



                        



Medications

      



There is no data.                  



Problems

      



             Date Dx Coded           Attending           Type           Code    

       

Diagnosis                               Diagnosed By        

 

             2019           CALLI NIETO           Ot           G45

.9           

TRANSIENT CEREBRAL ISCHEMIC ATTACK, [facility]                    

 

             2019           CALLI NIETO           Ot           H53

.8           

OTHER VISUAL DISTURBANCES                        

 

             2019           CALLI NIETO           Ot           I10

           

ESSENTIAL (PRIMARY) HYPERTENSION                    

 

                2019           CALLI NIETO           Ot           

   Z87.19          

                          PERSONAL HISTORY OF OTHER DISEASES OF               

      

 

                2019           CALLI NIETO           Ot           

   Z98.51          

                          TUBAL LIGATION STATUS                    

 

             2019           CALLI NIETO           Ot           G45

.9           

TRANSIENT CEREBRAL ISCHEMIC ATTACK, UNS                    

 

             2019           CALLI NIETO           Ot           H53

.8           

OTHER VISUAL DISTURBANCES                        

 

             2019           CALLI NEITO           Ot           I10

           

ESSENTIAL (PRIMARY) HYPERTENSION                    

 

                2019           CALLI NIETO           Ot           

   Z87.19          

                          PERSONAL HISTORY OF OTHER DISEASES OF               

      

 

                2019           CALLI NIETO           Ot           

   Z98.51          

                          TUBAL LIGATION STATUS                    

 

             2019           WILDA PARKINSON MD           Ot           I10    

       

ESSENTIAL (PRIMARY) HYPERTENSION                    

 

             2019           WILDA PARKINSON MD           Ot           R42    

       

DIZZINESS AND GIDDINESS                          

 

             2019           WILDA PARKINSON MD           Ot           Z86.73 

          

PRSNL HX OF TIA (TIA), AND CEREB INFRC W                    

 

             2019           WILDA PARKINSON MD           Ot           Z98.51 

          

TUBAL LIGATION STATUS                            

 

             2019           WILDA PARKINSON MD           Ot           I10    

       

ESSENTIAL (PRIMARY) HYPERTENSION                    

 

             2019           WILDA PARKINSON MD           Ot           R42    

       

DIZZINESS AND GIDDINESS                          

 

             2019           WILDA PARKINSON MD           Ot           Z86.73 

          

PRSNL HX OF TIA (TIA), AND CEREB INFRC W                    

 

             2019           WILDA PARKINSON MD           Ot           Z98.51 

          

TUBAL LIGATION STATUS                            

 

             2019           YAHIR OLIVER DO           Ot           I10

           

ESSENTIAL (PRIMARY) HYPERTENSION                    

 

                2019           YAHIR OLIVER DO           Ot           

   L03.031         

                          CELLULITIS OF RIGHT TOE                    

 

                2019           YAHIR OLIVER DO, Ot           

   T25.231A        

                          BURN OF SECOND DEGREE OF RIGHT TOE(S) (N              

      

 

             2019           YAHIR OLIVER DO           Ot           T31

.0           

BURNS INVOLVING LESS THAN 10% OF BODY TRAYLOR                    

 

                2019           YAHIR OLIVER DO, Ot           

   X11.8XXA        

                          CONTACT WITH OTHER HOT TAP-WATER, INITIA              

      

 

                2019           YAHIR OLIVER DO, Ot           

   Z86.73          

                          PRSNL HX OF TIA (TIA), AND CEREB INFRC W              

      

 

             2019           YAHIR OLIVER DO           Ot           Z88

.0           

ALLERGY STATUS TO PENICILLIN                     

 

             2019           YAHIR OLIVER DO           Ot           Z88

.5           

ALLERGY STATUS TO NARCOTIC AGENT STATUS                    

 

             2019           YAHIR OLIVER DO           Ot           Z88

.8           

ALLERGY STATUS TO OTH DRUG/MEDS/BIOL SUB                    

 

                2019           YAHIR OLIVER DO           Ot           

   Z98.51          

                          TUBAL LIGATION STATUS                    

 

                2019           YAHIR OLIVER DO           Ot           

   Z98.890         

                          OTHER SPECIFIED POSTPROCEDURAL STATES                 

   

 

             2019           YAHIR OLIVER DO           Ot           I10

           

ESSENTIAL (PRIMARY) HYPERTENSION                    

 

                2019           YAHIR OLIVER DO           Ot           

   L03.031         

                          CELLULITIS OF RIGHT TOE                    

 

                2019           YAHIR OLIVER DO, Ot           

   T25.231A        

                          BURN OF SECOND DEGREE OF RIGHT TOE(S) (N              

      

 

             2019           YAHIR OLIVER DO, Ot           T31

.0           

BURNS INVOLVING LESS THAN 10% OF BODY TRAYLOR                    

 

                2019           YAHIR OLIVER DO           Ot           

   X11.8XXA        

                          CONTACT WITH OTHER HOT TAP-WATER, INITIA              

      

 

                2019           YAHIR OLIVER DO, Ot           

   Z86.73          

                          PRSNL HX OF TIA (TIA), AND CEREB INFRC W              

      

 

             2019           YAHIR OLIVER DO           Ot           Z88

.0           

ALLERGY STATUS TO PENICILLIN                     

 

             2019           YAHIR OLIVER DO           Ot           Z88

.5           

ALLERGY STATUS TO NARCOTIC AGENT STATUS                    

 

             2019           YAHIR OLIVER DO           Ot           Z88

.8           

ALLERGY STATUS TO OTH DRUG/MEDS/BIOL SUB                    

 

                2019           YAHIR OLIVER DO           Ot           

   Z98.51          

                          TUBAL LIGATION STATUS                    

 

                2019           YAHIR OLIVER DO           Ot           

   Z98.890         

                          OTHER SPECIFIED POSTPROCEDURAL STATES                 

   

 

             2020           VELA DO, VAIBHAV           Ot           E78.5 

          

HYPERLIPIDEMIA, UNSPECIFIED                      

 

             2020           VELA DO, VAIBHAV           Ot           G45.9 

          

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                    

 

             2020           VELA DO, VAIBHAV           Ot           H40.9 

          

UNSPECIFIED GLAUCOMA                             

 

             2020           VELA DO, VAIBHAV           Ot           I07.1 

          

RHEUMATIC TRICUSPID INSUFFICIENCY                    

 

             2020           VELA DO, VAIBHAV           Ot           I10   

        

ESSENTIAL (PRIMARY) HYPERTENSION                    

 

             2020           VELA DO, VAIBHAV           Ot           R42   

        

DIZZINESS AND GIDDINESS                          

 

             2020           VELA DO, VAIBHAV           Ot           R55   

        

SYNCOPE AND COLLAPSE                             

 

             2020           VELA DO, VAIBHAV           Ot           Z79.89

9           

OTHER LONG TERM (CURRENT) DRUG THERAPY                    

 

             2020           VELA DO, VAIBHAV           Ot           E78.5 

          

HYPERLIPIDEMIA, UNSPECIFIED                      

 

             2020           VELA DO, VAIBHAV           Ot           G45.9 

          

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                    

 

             2020           VELA DO, VAIBHAV           Ot           H40.9 

          

UNSPECIFIED GLAUCOMA                             

 

             2020           VELA DO, VAIBHAV           Ot           I07.1 

          

RHEUMATIC TRICUSPID INSUFFICIENCY                    

 

             2020           VELA DO, VAIBHAV           Ot           I10   

        

ESSENTIAL (PRIMARY) HYPERTENSION                    

 

             2020           VELA DO, VAIBHAV           Ot           R42   

        

DIZZINESS AND GIDDINESS                          

 

             2020           VELA DO, VAIBHAV           Ot           R55   

        

SYNCOPE AND COLLAPSE                             

 

             2020           VELA DO, VAIBHAV           Ot           Z79.89

9           

OTHER LONG TERM (CURRENT) DRUG THERAPY                    

 

             2020           ROMY HAQUE, KAT CORTES           Ot           G45.

9           

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                    

 

             2020           ROMY HAQUE, KAT CORTES           Ot           H53.

8           

OTHER VISUAL DISTURBANCES                        

 

             2020           ROMY HAQUE, KAT CORTES           Ot           I10 

          

ESSENTIAL (PRIMARY) HYPERTENSION                    

 

             2020           ROMY HAQUE, KAT CORTES           Ot           Z79.

82           

LONG TERM (CURRENT) USE OF ASPIRIN                    

 

             2020           ROMY HAQUE, KAT S           Ot           Z86.

73           

PRSNL HX OF TIA (TIA), AND CEREB INFRC W                    

 

             2020           ROMY HAQUE, KAT S           Ot           Z88.

0           

ALLERGY STATUS TO PENICILLIN                     

 

             2020           ROMY HAQUE, KAT CORTES           Ot           Z88.

8           

ALLERGY STATUS TO OTH DRUG/MEDS/BIOL SUB                    

 

             2020           ROMY HAQUE, KAT S           Ot           Z98.

51           

TUBAL LIGATION STATUS                            

 

             2020           ROMY HAQUE, KAT CORTES           Ot           G45.

9           

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                    

 

             2020           ROMY HAQUE, KAT S           Ot           H53.

8           

OTHER VISUAL DISTURBANCES                        

 

             2020           ROMY HAQUE, KAT S           Ot           I10 

          

ESSENTIAL (PRIMARY) HYPERTENSION                    

 

             2020           ROMY HAQUE, KAT CORTES           Ot           Z79.

82           

LONG TERM (CURRENT) USE OF ASPIRIN                    

 

             2020           ROMY HAQUE, KAT CORTES           Ot           Z86.

73           

PRSNL HX OF TIA (TIA), AND CEREB INFRC W                    

 

             2020           ROMY HAQUE, KAT S           Ot           Z88.

0           

ALLERGY STATUS TO PENICILLIN                     

 

             2020           ROMY HAQUE, KAT CORTES           Ot           Z88.

8           

ALLERGY STATUS TO OTH DRUG/MEDS/BIOL SUB                    

 

             2020           ROMY HAQUE, KAT S           Ot           Z98.

51           

TUBAL LIGATION STATUS                            

 

             2020           ROMY HAQUE, KAT CORTES           Ot           G45.

9           

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                    

 

             2020           ROMY HAQUE, KAT S           Ot           H53.

8           

OTHER VISUAL DISTURBANCES                        

 

             2020           ROMY HAQUE, KAT S           Ot           I10 

          

ESSENTIAL (PRIMARY) HYPERTENSION                    

 

             2020           ROMY HAQUE, KAT CORTES           Ot           Z79.

82           

LONG TERM (CURRENT) USE OF ASPIRIN                    

 

             2020           ROMY HAQUE, KAT CORTES           Ot           Z86.

73           

PRSNL HX OF TIA (TIA), AND CEREB INFRC W                    

 

             2020           ROMY HAQUE, KAT CORTES           Ot           Z88.

0           

ALLERGY STATUS TO PENICILLIN                     

 

             2020           ROMY HAQUE, KAT CORTES           Ot           Z88.

8           

ALLERGY STATUS TO OTH DRUG/MEDS/BIOL SUB                    

 

             2020           ROMY HAQUE, KAT CORTES           Ot           Z98.

51           

TUBAL LIGATION STATUS                            

 

             2020           HÉCTOR HAQUE, CONG WINN           Ot           G45

.9           

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                    

 

             2020           HÉCTOR HAQUE, CONG WINN           Ot           I63

.9           

CEREBRAL INFARCTION, UNSPECIFIED                    

 

             2020           VELA DO, VAIBHAV           Ot           E78.5 

          

HYPERLIPIDEMIA, UNSPECIFIED                      

 

             2020           VELA DO, VAIBHAV           Ot           G45.9 

          

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                    

 

             2020           VELA DO, VAIBHAV           Ot           H40.9 

          

UNSPECIFIED GLAUCOMA                             

 

             2020           VELA DO, VAIBHAV           Ot           I07.1 

          

RHEUMATIC TRICUSPID INSUFFICIENCY                    

 

             2020           VELA DO, VAIBHAV           Ot           I10   

        

ESSENTIAL (PRIMARY) HYPERTENSION                    

 

             2020           VELA DO, VAIBHAV           Ot           R42   

        

DIZZINESS AND GIDDINESS                          

 

             2020           VELA DO, VAIBHAV           Ot           R55   

        

SYNCOPE AND COLLAPSE                             

 

             2020           VELA DO, VAIBHAV           Ot           Z79.89

9           

OTHER LONG TERM (CURRENT) DRUG THERAPY                    

 

             2020           VELA DO, VAIBHAV           Ot           E78.5 

          

HYPERLIPIDEMIA, UNSPECIFIED                      

 

             2020           VELA DO, VAIBHAV           Ot           G45.9 

          

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                    

 

             2020           VELA DO, VAIBHAV           Ot           H40.9 

          

UNSPECIFIED GLAUCOMA                             

 

             2020           VELA DO, VAIBHAV           Ot           I07.1 

          

RHEUMATIC TRICUSPID INSUFFICIENCY                    

 

             2020           VELA DO, VAIBHAV           Ot           I10   

        

ESSENTIAL (PRIMARY) HYPERTENSION                    

 

             2020           VELA DO, VAIBHAV           Ot           R42   

        

DIZZINESS AND GIDDINESS                          

 

             2020           VELA DO, VAIBHAV           Ot           R55   

        

SYNCOPE AND COLLAPSE                             

 

             2020           VELA DO, VAIBHAV           Ot           Z79.89

9           

OTHER LONG TERM (CURRENT) DRUG THERAPY                    

 

             2020           HÉCTOR HAQUE, CONG WINN           Ot           G45

.9           

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                    

 

             2020           HÉCTOR HAQUE, CONG WINN           Ot           I63

.9           

CEREBRAL INFARCTION, UNSPECIFIED                    

 

             2020           HÉCTOR HAQUE, CONG WINN           Ot           G45

.9           

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                    

 

             2020           HÉCTOR HAQUE, CONG WINN           Ot           I63

.9           

CEREBRAL INFARCTION, UNSPECIFIED                    

 

                2020           MALORIE MORENO MD           Ot            

  H53.121          

                          TRANSIENT VISUAL LOSS, RIGHT EYE                    

 

             2020           MALORIE MORENO MD           Ot           I10 

          

ESSENTIAL (PRIMARY) HYPERTENSION                    

 

             2020           MALORIE MORENO MD J           Ot           I67.

4           

HYPERTENSIVE ENCEPHALOPATHY                      

 

             2020           MALORIE MORENO MD J           Ot           Z86.

73           

PRSNL HX OF TIA (TIA), AND CEREB INFRC W                    

 

             2020           MALORIE MORENO MD J           Ot           Z88.

0           

ALLERGY STATUS TO PENICILLIN                     

 

             2020           MALORIE MORENO MD J           Ot           Z88.

5           

ALLERGY STATUS TO NARCOTIC AGENT STATUS                    

 

             2020           MALORIE MORENO MD J           Ot           Z88.

8           

ALLERGY STATUS TO OTH DRUG/MEDS/BIOL SUB                    

 

             2020           MALORIE MORENO MD           Ot           Z98.

51           

TUBAL LIGATION STATUS                            

 

                2020           MALORIE MORENO MD           Ot            

  H53.121          

                          TRANSIENT VISUAL LOSS, RIGHT EYE                    

 

             2020           MALORIE MORENO MD           Ot           I10 

          

ESSENTIAL (PRIMARY) HYPERTENSION                    

 

             2020           MALORIE MORENO MD           Ot           I67.

4           

HYPERTENSIVE ENCEPHALOPATHY                      

 

             2020           MALORIE MORENO MD           Ot           Z86.

73           

PRSNL HX OF TIA (TIA), AND CEREB INFRC W                    

 

             2020           MALORIE MORENO MD J           Ot           Z88.

0           

ALLERGY STATUS TO PENICILLIN                     

 

             2020           MALORIE MORENO MD           Ot           Z88.

5           

ALLERGY STATUS TO NARCOTIC AGENT STATUS                    

 

             2020           MALORIE MORENO MD           Ot           Z88.

8           

ALLERGY STATUS TO OTH DRUG/MEDS/BIOL SUB                    

 

             2020           MALORIE MORENO MD           Ot           Z98.

51           

TUBAL LIGATION STATUS                            

 

                2020           MALORIE MORENO MD           Ot            

  H53.121          

                          TRANSIENT VISUAL LOSS, RIGHT EYE                    

 

             2020           MALORIE MORENO MD           Ot           I10 

          

ESSENTIAL (PRIMARY) HYPERTENSION                    

 

             2020           MALORIE MORENO MD           Ot           I67.

4           

HYPERTENSIVE ENCEPHALOPATHY                      

 

             2020           MALORIE MORENO MD J           Ot           Z86.

73           

PRSNL HX OF TIA (TIA), AND CEREB INFRC W                    

 

             2020           MALORIE MORENO MD           Ot           Z88.

0           

ALLERGY STATUS TO PENICILLIN                     

 

             2020           MALORIE MORENO MD, Ot           Z88.

5           

ALLERGY STATUS TO NARCOTIC AGENT STATUS                    

 

             2020           MALORIE MORENO MD           Ot           Z88.

8           

ALLERGY STATUS TO OTH DRUG/MEDS/BIOL SUB                    

 

             2020           MALORIE MORENO MD           Ot           Z98.

51           

TUBAL LIGATION STATUS                            

 

                2020           MALORIE MORENO MD           Ot            

  H53.121          

                          TRANSIENT VISUAL LOSS, RIGHT EYE                    

 

             2020           MALORIE MORENO MD           Ot           I10 

          

ESSENTIAL (PRIMARY) HYPERTENSION                    

 

             2020           MALOIRE MORENO MD           Ot           I67.

4           

HYPERTENSIVE ENCEPHALOPATHY                      

 

             2020           MALORIE MORENO MD           Ot           Z86.

73           

PRSNL HX OF TIA (TIA), AND CEREB INFRC W                    

 

             2020           MALORIE MORENO MD, Ot           Z88.

0           

ALLERGY STATUS TO PENICILLIN                     

 

             2020           MALORIE MORENO MD, Ot           Z88.

5           

ALLERGY STATUS TO NARCOTIC AGENT STATUS                    

 

             2020           MALORIE MORENO MD           Ot           Z88.

8           

ALLERGY STATUS TO OTH DRUG/MEDS/BIOL SUB                    

 

             2020           MALORIE MORENO MD           Ot           Z98.

51           

TUBAL LIGATION STATUS                            

 

             2020           REBEKA CEDILLO MD           Ot           M85.88

           

OTH DISRD OF BONE DENSITY AND STRUCTURE,                    

 

             2020           REBEKA CEDILLO MD           Ot           Z13.82

0           

ENCOUNTER FOR SCREENING FOR OSTEOPOROSIS                    

 

             2020           REBEKA CEDILLO MD           Ot           Z78.0 

          

ASYMPTOMATIC MENOPAUSAL STATE                    



                                                                                
                                                                                
                                                                                
                                               



Procedures

      



There is no data.                  



Results

      



                    Test                Result              Range        

 

                                        Complete blood count (CBC) with automate

d white blood cell (WBC) differential - 

19 16:36         

 

                          Blood leukocytes automated count (number/volume)      

     7.0 10*3/uL          

                                        4.3-11.0        

 

                          Blood erythrocytes automated count (number/volume)    

       4.62 10*6/uL       

                                        4.35-5.85        

 

                    Venous blood hemoglobin measurement (mass/volume)           

14.1 g/dL           

11.5-16.0        

 

                    Blood hematocrit (volume fraction)           43 %           

     35-52        

 

                    Automated erythrocyte mean corpuscular volume           93 [

foz_us]           

80-99        

 

                                        Automated erythrocyte mean corpuscular h

emoglobin (mass per erythrocyte)        

                          31 pg                     25-34        

 

                                        Automated erythrocyte mean corpuscular h

emoglobin concentration measurement 

(mass/volume)             33 g/dL                   32-36        

 

                    Automated erythrocyte distribution width ratio           13.

6 %              10.0-

14.5        

 

                    Automated blood platelet count (count/volume)           181 

10*3/uL           

130-400        

 

                          Automated blood platelet mean volume measurement      

     10.7 [foz_us]        

                                        7.4-10.4        

 

                    Automated blood neutrophils/100 leukocytes           75 %   

             42-75       

 

 

                    Automated blood lymphocytes/100 leukocytes           14 %   

             12-44       

 

 

                    Blood monocytes/100 leukocytes           9 %                

 0-12        

 

                    Automated blood eosinophils/100 leukocytes           1 %    

             0-10        

 

                    Automated blood basophils/100 leukocytes           0 %      

           0-10        

 

                    Blood neutrophils automated count (number/volume)           

5.2 10*3            

1.8-7.8        

 

                    Blood lymphocytes automated count (number/volume)           

1.0 10*3            

1.0-4.0        

 

                    Blood monocytes automated count (number/volume)           0.

6 10*3            

0.0-1.0        

 

                    Automated eosinophil count           0.1 10*3/uL           0

.0-0.3        

 

                    Automated blood basophil count (count/volume)           0.0 

10*3/uL           

0.0-0.1        

 

                                        PT panel in platelet poor plasma by coag

ulation assay - 19 16:36         

 

                          Prothrombin time (PT) in platelet poor plasma by coagu

lation assay           

13.0 s                                  12.2-14.7        

 

                          INR in platelet poor plasma or blood by coagulation as

say           1.0         

                                        0.8-1.4        

 

                                        Activated partial thromboplastin time (a

PTT) in platelet poor plasma 

bycoagulation assay - 19 16:36         

 

                                        Activated partial thromboplastin time (a

PTT) in platelet poor plasma 

bycoagulation assay           26 s                      24-35        

 

                                        Fibrin D-dimer FEU measurement in platel

et poor plasma (mass/volume) - 19 

16:36         

 

                          Fibrin D-dimer FEU measurement in platelet poor plasma

 (mass/volume)           

0.79 ug/mL                              0.00-0.49        

 

                                        Comprehensive metabolic panel - 19

 16:36         

 

                          Serum or plasma sodium measurement (moles/volume)     

      143 mmol/L          

                                        135-145        

 

                          Serum or plasma potassium measurement (moles/volume)  

         3.9 mmol/L       

                                        3.6-5.0        

 

                          Serum or plasma chloride measurement (moles/volume)   

        108 mmol/L        

                                                

 

                    Carbon dioxide           24 mmol/L           21-32        

 

                          Serum or plasma anion gap determination (moles/volume)

           11 mmol/L      

                                        5-14        

 

                          Serum or plasma urea nitrogen measurement (mass/volume

)           17 mg/dL      

                                        7-18        

 

                          Serum or plasma creatinine measurement (mass/volume)  

         1.02 mg/dL       

                                        0.60-1.30        

 

                    Serum or plasma urea nitrogen/creatinine mass ratio         

  17                  NRG 

       

 

                                        Serum or plasma creatinine measurement w

ith calculation of estimated glomerular 

filtration rate           52                        NRG        

 

                    Serum or plasma glucose measurement (mass/volume)           

111 mg/dL           

        

 

                    Serum or plasma calcium measurement (mass/volume)           

9.5 mg/dL           

8.5-10.1        

 

                          Serum or plasma total bilirubin measurement (mass/volu

me)           0.3 mg/dL   

                                        0.1-1.0        

 

                                        Serum or plasma alkaline phosphatase isac

surement (enzymatic activity/volume)    

                          72 U/L                            

 

                                        Serum or plasma aspartate aminotransfera

se measurement (enzymatic 

activity/volume)           26 U/L                    5-34        

 

                                        Serum or plasma alanine aminotransferase

 measurement (enzymatic activity/volume)

                          25 U/L                    0-55        

 

                    Serum or plasma protein measurement (mass/volume)           

6.4 g/dL            

6.4-8.2        

 

                    Serum or plasma albumin measurement (mass/volume)           

4.2 g/dL            

3.2-4.5        

 

                    CALCIUM CORRECTED           9.3 mg/dL           8.5-10.1    

    

 

                                        Serum or plasma troponin i.cardiac measu

rement (mass/volume) - 19 16:36   

      

 

                          Serum or plasma troponin i.cardiac measurement (mass/v

olume)           < ng/mL  

                                        <0.028        

 

                                        Complete urinalysis with reflex to cultu

re - 19 16:41         

 

                    Urine color determination           YELLOW              NRG 

       

 

                    Urine clarity determination           CLEAR               NR

G        

 

                    Urine pH measurement by test strip           5              

     5-9        

 

                    Specific gravity of urine by test strip           1.010     

          1.016-1.022  

      

 

                          Urine protein assay by test strip, semi-quantitative  

         NEGATIVE         

                                        NEGATIVE        

 

                    Urine glucose detection by automated test strip           NE

GATIVE            

NEGATIVE        

 

                          Erythrocytes detection in urine sediment by light micr

oscopy           NEGATIVE 

                                        NEGATIVE        

 

                    Urine ketones detection by automated test strip           NE

GATIVE            

NEGATIVE        

 

                    Urine nitrite detection by test strip           NEGATIVE    

        NEGATIVE    

    

 

                    Urine total bilirubin detection by test strip           NEGA

TIVE            

NEGATIVE        

 

                          Urine urobilinogen measurement by automated test strip

 (mass/volume)           

NORMAL                                  NORMAL        

 

                    Urine leukocyte esterase detection by dipstick           1+ 

                 NEGATIVE 

       

 

                                        Automated urine sediment erythrocyte cou

nt by microscopy (number/high power 

field)                    NONE                      NRG        

 

                                        Automated urine sediment leukocyte count

 by microscopy (number/high power field)

                           [HPF]                    NRG        

 

                          Bacteria detection in urine sediment by light microsco

py           NEGATIVE     

                                        NRG        

 

                                        Squamous epithelial cells detection in u

rine sediment by light microscopy       

                          0-2                       NRG        

 

                          Crystals detection in urine sediment by light microsco

py           NONE         

                                        NRG        

 

                    Casts detection in urine sediment by light microscopy       

    NONE                

NRG        

 

                          Mucus detection in urine sediment by light microscopy 

          NEGATIVE        

                                        NRG        

 

                    Complete urinalysis with reflex to culture           NO     

             NRG        

 

                                        Complete blood count (CBC) with automate

d white blood cell (WBC) differential - 

19 16:57         

 

                          Blood leukocytes automated count (number/volume)      

     6.0 10*3/uL          

                                        4.3-11.0        

 

                          Blood erythrocytes automated count (number/volume)    

       4.45 10*6/uL       

                                        4.35-5.85        

 

                    Venous blood hemoglobin measurement (mass/volume)           

13.6 g/dL           

11.5-16.0        

 

                    Blood hematocrit (volume fraction)           42 %           

     35-52        

 

                    Automated erythrocyte mean corpuscular volume           94 [

foz_us]           

80-99        

 

                                        Automated erythrocyte mean corpuscular h

emoglobin (mass per erythrocyte)        

                          31 pg                     25-34        

 

                                        Automated erythrocyte mean corpuscular h

emoglobin concentration measurement 

(mass/volume)             33 g/dL                   32-36        

 

                    Automated erythrocyte distribution width ratio           12.

9 %              10.0-

14.5        

 

                    Automated blood platelet count (count/volume)           168 

10*3/uL           

130-400        

 

                          Automated blood platelet mean volume measurement      

     11.3 [foz_us]        

                                        7.4-10.4        

 

                    Automated blood neutrophils/100 leukocytes           63 %   

             42-75       

 

 

                    Automated blood lymphocytes/100 leukocytes           25 %   

             12-44       

 

 

                    Blood monocytes/100 leukocytes           9 %                

 0-12        

 

                    Automated blood eosinophils/100 leukocytes           2 %    

             0-10        

 

                    Automated blood basophils/100 leukocytes           1 %      

           0-10        

 

                    Blood neutrophils automated count (number/volume)           

3.8 10*3            

1.8-7.8        

 

                    Blood lymphocytes automated count (number/volume)           

1.5 10*3            

1.0-4.0        

 

                    Blood monocytes automated count (number/volume)           0.

6 10*3            

0.0-1.0        

 

                    Automated eosinophil count           0.1 10*3/uL           0

.0-0.3        

 

                    Automated blood basophil count (count/volume)           0.0 

10*3/uL           

0.0-0.1        

 

                                        PT panel in platelet poor plasma by coag

ulation assay - 19 16:57         

 

                          Prothrombin time (PT) in platelet poor plasma by coagu

lation assay           

13.1 s                                  12.2-14.7        

 

                          INR in platelet poor plasma or blood by coagulation as

say           1.0         

                                        0.8-1.4        

 

                                        Activated partial thromboplastin time (a

PTT) in platelet poor plasma 

bycoagulation assay - 19 16:57         

 

                                        Activated partial thromboplastin time (a

PTT) in platelet poor plasma 

bycoagulation assay           25 s                      24-35        

 

                                        Comprehensive metabolic panel - 19

 16:57         

 

                          Serum or plasma sodium measurement (moles/volume)     

      140 mmol/L          

                                        135-145        

 

                          Serum or plasma potassium measurement (moles/volume)  

         4.1 mmol/L       

                                        3.6-5.0        

 

                          Serum or plasma chloride measurement (moles/volume)   

        102 mmol/L        

                                                

 

                    Carbon dioxide           19 mmol/L           21-32        

 

                          Serum or plasma anion gap determination (moles/volume)

           19 mmol/L      

                                        5-14        

 

                          Serum or plasma urea nitrogen measurement (mass/volume

)           18 mg/dL      

                                        7-18        

 

                          Serum or plasma creatinine measurement (mass/volume)  

         0.88 mg/dL       

                                        0.60-1.30        

 

                    Serum or plasma urea nitrogen/creatinine mass ratio         

  20                  NRG 

       

 

                                        Serum or plasma creatinine measurement w

ith calculation of estimated glomerular 

filtration rate           >                         NRG        

 

                    Serum or plasma glucose measurement (mass/volume)           

99 mg/dL            

        

 

                    Serum or plasma calcium measurement (mass/volume)           

9.2 mg/dL           

8.5-10.1        

 

                          Serum or plasma total bilirubin measurement (mass/volu

me)           0.2 mg/dL   

                                        0.1-1.0        

 

                                        Serum or plasma alkaline phosphatase isac

surement (enzymatic activity/volume)    

                          66 U/L                            

 

                                        Serum or plasma aspartate aminotransfera

se measurement (enzymatic 

activity/volume)           19 U/L                    5-34        

 

                                        Serum or plasma alanine aminotransferase

 measurement (enzymatic activity/volume)

                          22 U/L                    0-55        

 

                    Serum or plasma protein measurement (mass/volume)           

6.0 g/dL            

6.4-8.2        

 

                    Serum or plasma albumin measurement (mass/volume)           

3.9 g/dL            

3.2-4.5        

 

                    CALCIUM CORRECTED           9.3 mg/dL           8.5-10.1    

    

 

                                        TROPONIN T - 19 16:57         

 

                    TROPONIN T           12 %                <=10        

 

                                        Complete urinalysis with reflex to cultu

re - 19 18:27         

 

                    Urine color determination           YELLOW              NRG 

       

 

                    Urine clarity determination           CLEAR               NR

G        

 

                    Urine pH measurement by test strip           6.5            

     5-9        

 

                    Specific gravity of urine by test strip           <         

          1.016-1.022      

  

 

                          Urine protein assay by test strip, semi-quantitative  

         NEGATIVE         

                                        NEGATIVE        

 

                    Urine glucose detection by automated test strip           NE

GATIVE            

NEGATIVE        

 

                          Erythrocytes detection in urine sediment by light micr

oscopy           TRACE    

                                        NEGATIVE        

 

                    Urine ketones detection by automated test strip           NE

GATIVE            

NEGATIVE        

 

                    Urine nitrite detection by test strip           NEGATIVE    

        NEGATIVE    

    

 

                    Urine total bilirubin detection by test strip           NEGA

TIVE            

NEGATIVE        

 

                          Urine urobilinogen measurement by automated test strip

 (mass/volume)           

0.2 mg/dL                               NORMAL        

 

                    Urine leukocyte esterase detection by dipstick           TRA

CE               

NEGATIVE        

 

                                        Automated urine sediment erythrocyte cou

nt by microscopy (number/high power 

field)                    NONE                      NRG        

 

                                        Automated urine sediment leukocyte count

 by microscopy (number/high power field)

                           [HPF]                    NRG        

 

                          Bacteria detection in urine sediment by light microsco

py           NONE         

                                        NRG        

 

                                        Squamous epithelial cells detection in u

rine sediment by light microscopy       

                          0-2                       NRG        

 

                          Crystals detection in urine sediment by light microsco

py           NONE         

                                        NRG        

 

                    Casts detection in urine sediment by light microscopy       

    NONE                

NRG        

 

                          Mucus detection in urine sediment by light microscopy 

          NEGATIVE        

                                        NRG        

 

                    Complete urinalysis with reflex to culture           NO     

             NRG        

 

                                        CMP - 19 08:09         

 

                    GLUCOSE             96 mg/dL            65-99        

 

                    UREA NITROGEN (BUN)           12 mg/dL            7-25      

  

 

                    CREATININE           0.93 mg/dL           0.60-0.88        

 

                    eGFR NON-AFR. AMERICAN           58 mL/min/1.73m2           

> OR = 60        

 

                    eGFR            67 mL/min/1.73m2           >

 OR = 60        

 

                    BUN/CREATININE RATIO           13 (calc)           6-22     

   

 

                    SODIUM              143 mmol/L           135-146        

 

                    POTASSIUM           4.6 mmol/L           3.5-5.3        

 

                    CHLORIDE            108 mmol/L                   

 

                    CARBON DIOXIDE           28 mmol/L           20-32        

 

                    CALCIUM             9.2 mg/dL           8.6-10.4        

 

                    PROTEIN, TOTAL           5.9 g/dL            6.1-8.1        

 

                    ALBUMIN             4.0 g/dL            3.6-5.1        

 

                    GLOBULIN            1.9 g/dL (calc)           1.9-3.7       

 

 

                    ALBUMIN/GLOBULIN RATIO           2.1 (calc)           1.0-2.

5        

 

                    BILIRUBIN, TOTAL           0.6 mg/dL           0.2-1.2      

  

 

                    ALKALINE PHOSPHATASE           51 U/L                 

     

 

                    AST                 20 U/L              10-35        

 

                    ALT                 18 U/L              6-29        

 

                                        A1C - 19 08:09         

 

                    HEMOGLOBIN A1c           5.6 % of total Hgb           <5.7  

      

 

                                        CBC w/MANUAL DIFF - 19 09:25      

   

 

                    WHITE BLOOD CELL COUNT           5.3 Thousand/uL           3

.8-10.8        

 

                    RED BLOOD CELL COUNT           4.81 Million/uL           3.8

0-5.10        

 

                    HEMOGLOBIN           14.7 g/dL           11.7-15.5        

 

                    HEMATOCRIT           44.9 %              35.0-45.0        

 

                    MCV                 93.3 fL             80.0-100.0        

 

                    MCH                 30.6 pg             27.0-33.0        

 

                    MCHC                32.7 g/dL           32.0-36.0        

 

                    RDW                 12.9 %              11.0-15.0        

 

                    PLATELET COUNT           213 Thousand/uL           140-400  

      

 

                    MPV                 11.2 fL             7.5-12.5        

 

                    ABSOLUTE NEUTROPHILS           3572 cells/uL           1500-

7800        

 

                    ABSOLUTE MONOCYTES           270 cells/uL           200-950 

       

 

                    ABSOLUTE EOSINOPHILS           53 cells/uL            

       

 

                    ABSOLUTE BASOPHILS           0 cells/uL           0-200     

   

 

                    NEUTROPHILS           67.4 %              NRG        

 

                    LYMPHOCYTES           26.5 %              NRG        

 

                    MONOCYTES           5.1 %               NRG        

 

                    EOSINOPHILS           1.0 %               NRG        

 

                    BASOPHILS           0 %                 NRG        

 

                    ABSOLUTE LYMPHOCYTES           1405 cells/uL           850-3

900        

 

                    PLATELET ESTIMATION           ADEQUATE            ADEQUATE  

      

 

                    CBC MORPHOLOGY                               NORMAL        

 

                                        Blood CBC with ordered manual differenti

al panel - 20 09:55         

 

                          Blood leukocytes automated count (number/volume)      

     5.6 10*3/uL          

                                        4.3-11.0        

 

                          Blood erythrocytes automated count (number/volume)    

       4.72 10*6/uL       

                                        4.35-5.85        

 

                    Venous blood hemoglobin measurement (mass/volume)           

14.5 g/dL           

11.5-16.0        

 

                    Blood hematocrit (volume fraction)           44 %           

     35-52        

 

                    Automated erythrocyte mean corpuscular volume           93 [

foz_us]           

80-99        

 

                                        Automated erythrocyte mean corpuscular h

emoglobin (mass per erythrocyte)        

                          31 pg                     25-34        

 

                                        Automated erythrocyte mean corpuscular h

emoglobin concentration measurement 

(mass/volume)             33 g/dL                   32-36        

 

                    Automated erythrocyte distribution width ratio           12.

8 %              10.0-

14.5        

 

                    Automated blood platelet count (count/volume)           199 

10*3/uL           

130-400        

 

                          Automated blood platelet mean volume measurement      

     10.5 [foz_us]        

                                        7.4-10.4        

 

                    Automated blood neutrophils/100 leukocytes           63 %   

             42-75       

 

 

                    Automated blood lymphocytes/100 leukocytes           24 %   

             12-44       

 

 

                    Blood monocytes/100 leukocytes           9 %                

 NRG        

 

                    Automated blood eosinophils/100 leukocytes           4 %    

             0-10        

 

                    Automated blood basophils/100 leukocytes           1 %      

           0-10        

 

                    Blood neutrophils automated count (number/volume)           

3.5 10*3            

1.8-7.8        

 

                    Blood lymphocytes automated count (number/volume)           

1.3 10*3            

1.0-4.0        

 

                    Blood monocytes automated count (number/volume)           0.

4 10*3            

0.0-1.0        

 

                    Automated eosinophil count           0.2 10*3/uL           0

.0-0.3        

 

                    Automated blood basophil count (count/volume)           0.1 

10*3/uL           

0.0-0.1        

 

                    Manual blood segmented neutrophils/100 leukocytes           

60 %                NRG  

      

 

                    Blood band neutrophils/100 leukocytes           2 %         

        NRG        

 

                    Manual blood lymphocytes/100 leukocytes           26 %      

          NRG        

 

                    Manual eosinophils/100 leukocytes in nose           2 %     

            NRG        

 

                    Manual blood basophils/100 leukocytes           1 %         

        NRG        

 

                                        PT panel in platelet poor plasma by coag

ulation assay - 20 09:55         

 

                          Prothrombin time (PT) in platelet poor plasma by coagu

lation assay           

13.3 s                                  12.2-14.7        

 

                          INR in platelet poor plasma or blood by coagulation as

say           1.0         

                                        0.8-1.4        

 

                                        Activated partial thromboplastin time (a

PTT) in platelet poor plasma 

bycoagulation assay - 20 09:55         

 

                                        Activated partial thromboplastin time (a

PTT) in platelet poor plasma 

bycoagulation assay           25 s                      24-35        

 

                                        Comprehensive metabolic panel - 20

 09:55         

 

                          Serum or plasma sodium measurement (moles/volume)     

      140 mmol/L          

                                        135-145        

 

                          Serum or plasma potassium measurement (moles/volume)  

         4.0 mmol/L       

                                        3.6-5.0        

 

                          Serum or plasma chloride measurement (moles/volume)   

        104 mmol/L        

                                                

 

                    Carbon dioxide           25 mmol/L           21-32        

 

                          Serum or plasma anion gap determination (moles/volume)

           11 mmol/L      

                                        5-14        

 

                          Serum or plasma urea nitrogen measurement (mass/volume

)           16 mg/dL      

                                        7-18        

 

                          Serum or plasma creatinine measurement (mass/volume)  

         0.89 mg/dL       

                                        0.60-1.30        

 

                    Serum or plasma urea nitrogen/creatinine mass ratio         

  18                  NRG 

       

 

                                        Serum or plasma creatinine measurement w

ith calculation of estimated glomerular 

filtration rate           >                         NRG        

 

                    Serum or plasma glucose measurement (mass/volume)           

104 mg/dL           

        

 

                    Serum or plasma calcium measurement (mass/volume)           

9.4 mg/dL           

8.5-10.1        

 

                          Serum or plasma total bilirubin measurement (mass/volu

me)           0.4 mg/dL   

                                        0.1-1.0        

 

                                        Serum or plasma alkaline phosphatase isac

surement (enzymatic activity/volume)    

                          68 U/L                            

 

                                        Serum or plasma aspartate aminotransfera

se measurement (enzymatic 

activity/volume)           22 U/L                    5-34        

 

                                        Serum or plasma alanine aminotransferase

 measurement (enzymatic activity/volume)

                          18 U/L                    0-55        

 

                    Serum or plasma protein measurement (mass/volume)           

6.2 g/dL            

6.4-8.2        

 

                    Serum or plasma albumin measurement (mass/volume)           

4.2 g/dL            

3.2-4.5        

 

                    CALCIUM CORRECTED           9.2 mg/dL           8.5-10.1    

    

 

                                        Complete urinalysis with reflex to cultu

re - 20 10:56         

 

                    Urine color determination           YELLOW              NRG 

       

 

                    Urine clarity determination           SLT CLOUDY            

NRG        

 

                    Urine pH measurement by test strip           7.0            

     5-9        

 

                    Specific gravity of urine by test strip           1.015     

          1.016-1.022  

      

 

                          Urine protein assay by test strip, semi-quantitative  

         NEGATIVE         

                                        NEGATIVE        

 

                    Urine glucose detection by automated test strip           NE

GATIVE            

NEGATIVE        

 

                          Erythrocytes detection in urine sediment by light micr

oscopy           NEGATIVE 

                                        NEGATIVE        

 

                    Urine ketones detection by automated test strip           NE

GATIVE            

NEGATIVE        

 

                    Urine nitrite detection by test strip           NEGATIVE    

        NEGATIVE    

    

 

                    Urine total bilirubin detection by test strip           NEGA

TIVE            

NEGATIVE        

 

                          Urine urobilinogen measurement by automated test strip

 (mass/volume)           

0.2 mg/dL                               < = 1.0        

 

                    Urine leukocyte esterase detection by dipstick           1+ 

                 NEGATIVE 

       

 

                                        Automated urine sediment erythrocyte cou

nt by microscopy (number/high power 

field)                    NONE                      NRG        

 

                                        Automated urine sediment leukocyte count

 by microscopy (number/high power field)

                           [HPF]                    NRG        

 

                          Bacteria detection in urine sediment by light microsco

py           NEGATIVE     

                                        NRG        

 

                                        Squamous epithelial cells detection in u

rine sediment by light microscopy       

                          0-2                       NRG        

 

                          Crystals detection in urine sediment by light microsco

py           NONE         

                                        NRG        

 

                    Casts detection in urine sediment by light microscopy       

    NONE                

NRG        

 

                          Mucus detection in urine sediment by light microscopy 

          SMALL           

                                        NRG        

 

                    Complete urinalysis with reflex to culture           YES    

             NRG        

 

                                        Bacterial urine culture - 20 10:56

         

 

                    Bacterial urine culture           SEE COMMEN            NRG 

       

 

                    COLONY COUNT           .                   NRG        

 

                    FREE TEXT ENTRY 2           PRELIM RAPID ID TEST AT [facility] 

 8:05            NRG

        

 

                                        Complete blood count (CBC) with automate

d white blood cell (WBC) differential - 

20 05:42         

 

                          Blood leukocytes automated count (number/volume)      

     5.2 10*3/uL          

                                        4.3-11.0        

 

                          Blood erythrocytes automated count (number/volume)    

       4.58 10*6/uL       

                                        4.35-5.85        

 

                    Venous blood hemoglobin measurement (mass/volume)           

14.0 g/dL           

11.5-16.0        

 

                    Blood hematocrit (volume fraction)           43 %           

     35-52        

 

                    Automated erythrocyte mean corpuscular volume           93 [

foz_us]           

80-99        

 

                                        Automated erythrocyte mean corpuscular h

emoglobin (mass per erythrocyte)        

                          31 pg                     25-34        

 

                                        Automated erythrocyte mean corpuscular h

emoglobin concentration measurement 

(mass/volume)             33 g/dL                   32-36        

 

                    Automated erythrocyte distribution width ratio           13.

4 %              10.0-

14.5        

 

                    Automated blood platelet count (count/volume)           186 

10*3/uL           

130-400        

 

                          Automated blood platelet mean volume measurement      

     11.3 [foz_us]        

                                        7.4-10.4        

 

                    Automated blood neutrophils/100 leukocytes           55 %   

             42-75       

 

 

                    Automated blood lymphocytes/100 leukocytes           30 %   

             12-44       

 

 

                    Blood monocytes/100 leukocytes           10 %               

 0-12        

 

                    Automated blood eosinophils/100 leukocytes           4 %    

             0-10        

 

                    Automated blood basophils/100 leukocytes           1 %      

           0-10        

 

                    Blood neutrophils automated count (number/volume)           

2.9 10*3            

1.8-7.8        

 

                    Blood lymphocytes automated count (number/volume)           

1.6 10*3            

1.0-4.0        

 

                    Blood monocytes automated count (number/volume)           0.

5 10*3            

0.0-1.0        

 

                    Automated eosinophil count           0.2 10*3/uL           0

.0-0.3        

 

                    Automated blood basophil count (count/volume)           0.0 

10*3/uL           

0.0-0.1        

 

                                        Comprehensive metabolic panel - 20

 05:49         

 

                          Serum or plasma sodium measurement (moles/volume)     

      141 mmol/L          

                                        135-145        

 

                          Serum or plasma potassium measurement (moles/volume)  

         3.7 mmol/L       

                                        3.6-5.0        

 

                          Serum or plasma chloride measurement (moles/volume)   

        108 mmol/L        

                                                

 

                    Carbon dioxide           22 mmol/L           21-32        

 

                          Serum or plasma anion gap determination (moles/volume)

           11 mmol/L      

                                        5-14        

 

                          Serum or plasma urea nitrogen measurement (mass/volume

)           15 mg/dL      

                                        7-18        

 

                          Serum or plasma creatinine measurement (mass/volume)  

         0.90 mg/dL       

                                        0.60-1.30        

 

                    Serum or plasma urea nitrogen/creatinine mass ratio         

  17                  NRG 

       

 

                                        Serum or plasma creatinine measurement w

ith calculation of estimated glomerular 

filtration rate           60                        NRG        

 

                    Serum or plasma glucose measurement (mass/volume)           

84 mg/dL            

        

 

                    Serum or plasma calcium measurement (mass/volume)           

9.2 mg/dL           

8.5-10.1        

 

                          Serum or plasma total bilirubin measurement (mass/volu

me)           0.5 mg/dL   

                                        0.1-1.0        

 

                                        Serum or plasma alkaline phosphatase isac

surement (enzymatic activity/volume)    

                          60 U/L                            

 

                                        Serum or plasma aspartate aminotransfera

se measurement (enzymatic 

activity/volume)           21 U/L                    5-34        

 

                                        Serum or plasma alanine aminotransferase

 measurement (enzymatic activity/volume)

                          19 U/L                    0-55        

 

                    Serum or plasma protein measurement (mass/volume)           

6.1 g/dL            

6.4-8.2        

 

                    Serum or plasma albumin measurement (mass/volume)           

3.9 g/dL            

3.2-4.5        

 

                    CALCIUM CORRECTED           9.3 mg/dL           8.5-10.1    

    

 

                                        Lipid 1996 panel - 20 05:49       

  

 

                          Serum or plasma triglyceride measurement (mass/volume)

           96 mg/dL       

                                        <150        

 

                          Serum or plasma cholesterol measurement (mass/volume) 

          223 mg/dL       

                                        < 200        

 

                          Serum or plasma cholesterol in HDL measurement (mass/v

olume)           62 mg/dL 

                                        40-60        

 

                          Cholesterol in LDL [mass/volume] in serum or plasma by

 direct assay           

149 mg/dL                               1-129        

 

                          Serum or plasma cholesterol in VLDL measurement (mass/

volume)           19 mg/dL

                                        5-40        

 

                                        Comprehensive metabolic panel - 20

 13:05         

 

                          Serum or plasma sodium measurement (moles/volume)     

      142 mmol/L          

                                        135-145        

 

                          Serum or plasma potassium measurement (moles/volume)  

         4.1 mmol/L       

                                        3.6-5.0        

 

                          Serum or plasma chloride measurement (moles/volume)   

        103 mmol/L        

                                                

 

                    Carbon dioxide           25 mmol/L           21-32        

 

                          Serum or plasma anion gap determination (moles/volume)

           14 mmol/L      

                                        5-14        

 

                          Serum or plasma urea nitrogen measurement (mass/volume

)           16 mg/dL      

                                        7-18        

 

                          Serum or plasma creatinine measurement (mass/volume)  

         0.94 mg/dL       

                                        0.60-1.30        

 

                    Serum or plasma urea nitrogen/creatinine mass ratio         

  17                  NRG 

       

 

                                        Serum or plasma creatinine measurement w

ith calculation of estimated glomerular 

filtration rate           57                        NRG        

 

                    Serum or plasma glucose measurement (mass/volume)           

112 mg/dL           

        

 

                          Serum or plasma calcium measurement (mass/volume)     

      10.0 mg/dL          

                                        8.5-10.1        

 

                          Serum or plasma total bilirubin measurement (mass/volu

me)           0.3 mg/dL   

                                        0.1-1.0        

 

                                        Serum or plasma alkaline phosphatase isac

surement (enzymatic activity/volume)    

                          70 U/L                            

 

                                        Serum or plasma aspartate aminotransfera

se measurement (enzymatic 

activity/volume)           33 U/L                    5-34        

 

                                        Serum or plasma alanine aminotransferase

 measurement (enzymatic activity/volume)

                          32 U/L                    0-55        

 

                    Serum or plasma protein measurement (mass/volume)           

6.9 g/dL            

6.4-8.2        

 

                    Serum or plasma albumin measurement (mass/volume)           

4.4 g/dL            

3.2-4.5        

 

                    CALCIUM CORRECTED           9.7 mg/dL           8.5-10.1    

    

 

                                        TROPONIN I FS - 20 13:05         

 

                    TROPONIN I FS           < 0.30              <0.30        

 

                                        Complete blood count (CBC) with automate

d white blood cell (WBC) differential - 

20 13:05         

 

                          Blood leukocytes automated count (number/volume)      

     6.5 10*3/uL          

                                        4.3-11.0        

 

                          Blood erythrocytes automated count (number/volume)    

       4.91 10*6/uL       

                                        4.35-5.85        

 

                    Venous blood hemoglobin measurement (mass/volume)           

15.0 g/dL           

11.5-16.0        

 

                    Blood hematocrit (volume fraction)           46 %           

     35-52        

 

                    Automated erythrocyte mean corpuscular volume           93 [

foz_us]           

80-99        

 

                                        Automated erythrocyte mean corpuscular h

emoglobin (mass per erythrocyte)        

                          31 pg                     25-34        

 

                                        Automated erythrocyte mean corpuscular h

emoglobin concentration measurement 

(mass/volume)             33 g/dL                   32-36        

 

                    Automated erythrocyte distribution width ratio           12.

7 %              10.0-

14.5        

 

                    Automated blood platelet count (count/volume)           198 

10*3/uL           

130-400        

 

                          Automated blood platelet mean volume measurement      

     10.4 [foz_us]        

                                        7.4-10.4        

 

                    Automated blood neutrophils/100 leukocytes           67 %   

             42-75       

 

 

                    Automated blood lymphocytes/100 leukocytes           23 %   

             12-44       

 

 

                    Blood monocytes/100 leukocytes           7 %                

 0-12        

 

                    Automated blood eosinophils/100 leukocytes           2 %    

             0-10        

 

                    Automated blood basophils/100 leukocytes           1 %      

           0-10        

 

                    Blood neutrophils automated count (number/volume)           

4.4 10*3            

1.8-7.8        

 

                    Blood lymphocytes automated count (number/volume)           

1.5 10*3            

1.0-4.0        

 

                    Blood monocytes automated count (number/volume)           0.

5 10*3            

0.0-1.0        

 

                    Automated eosinophil count           0.1 10*3/uL           0

.0-0.3        

 

                    Automated blood basophil count (count/volume)           0.1 

10*3/uL           

0.0-0.1        

 

                                        PT panel in platelet poor plasma by coag

ulation assay - 20 13:05         

 

                          Prothrombin time (PT) in platelet poor plasma by coagu

lation assay           

12.7 s                                  12.2-14.7        

 

                          INR in platelet poor plasma or blood by coagulation as

say           0.9         

                                        0.8-1.4        

 

                                        Activated partial thromboplastin time (a

PTT) in platelet poor plasma 

bycoagulation assay - 20 13:05         

 

                                        Activated partial thromboplastin time (a

PTT) in platelet poor plasma 

bycoagulation assay           25 s                      24-35        

 

                                        Fibrin D-dimer FEU measurement in platel

et poor plasma (mass/volume) - 20 

13:05         

 

                          Fibrin D-dimer FEU measurement in platelet poor plasma

 (mass/volume)           

0.85 ug/mL                              0.00-0.49        

 

                                        Capillary blood glucose measurement by g

lucometer (mass/volume) - 20 13:30

         

 

                          Capillary blood glucose measurement by glucometer (mas

s/volume)           101 

mg/dL                                           

 

                                        Capillary blood glucose measurement by g

lucometer (mass/volume) - 20 23:25

         

 

                          Capillary blood glucose measurement by glucometer (mas

s/volume)           106 

mg/dL                                           

 

                                        Complete blood count (CBC) with automate

d white blood cell (WBC) differential - 

20 23:30         

 

                          Blood leukocytes automated count (number/volume)      

     6.7 10*3/uL          

                                        4.3-11.0        

 

                          Blood erythrocytes automated count (number/volume)    

       4.39 10*6/uL       

                                        4.35-5.85        

 

                    Venous blood hemoglobin measurement (mass/volume)           

13.4 g/dL           

11.5-16.0        

 

                    Blood hematocrit (volume fraction)           40 %           

     35-52        

 

                    Automated erythrocyte mean corpuscular volume           92 [

foz_us]           

80-99        

 

                                        Automated erythrocyte mean corpuscular h

emoglobin (mass per erythrocyte)        

                          31 pg                     25-34        

 

                                        Automated erythrocyte mean corpuscular h

emoglobin concentration measurement 

(mass/volume)             33 g/dL                   32-36        

 

                    Automated erythrocyte distribution width ratio           12.

8 %              10.0-

14.5        

 

                    Automated blood platelet count (count/volume)           169 

10*3/uL           

130-400        

 

                          Automated blood platelet mean volume measurement      

     11.1 [foz_[id number]]        

                                        7.4-10.4        

 

                    Automated blood neutrophils/100 leukocytes           61 %   

             42-75       

 

 

                    Automated blood lymphocytes/100 leukocytes           26 %   

             12-44       

 

 

                    Blood monocytes/100 leukocytes           9 %                

 0-12        

 

                    Automated blood eosinophils/100 leukocytes           3 %    

             0-10        

 

                    Automated blood basophils/100 leukocytes           1 %      

           0-10        

 

                    Blood neutrophils automated count (number/volume)           

4.1 10*3            

1.8-7.8        

 

                    Blood lymphocytes automated count (number/volume)           

1.8 10*3            

1.0-4.0        

 

                    Blood monocytes automated count (number/volume)           0.

6 10*3            

0.0-1.0        

 

                    Automated eosinophil count           0.2 10*3/uL           0

.0-0.3        

 

                    Automated blood basophil count (count/volume)           0.1 

10*3/uL           

0.0-0.1        

 

                                        Comprehensive metabolic panel - 20

 23:30         

 

                          Serum or plasma sodium measurement (moles/volume)     

      138 mmol/L          

                                        135-145        

 

                          Serum or plasma potassium measurement (moles/volume)  

         4.5 mmol/L       

                                        3.6-5.0        

 

                          Serum or plasma chloride measurement (moles/volume)   

        102 mmol/L        

                                                

 

                    Carbon dioxide           24 mmol/L           21-32        

 

                          Serum or plasma anion gap determination (moles/volume)

           12 mmol/L      

                                        5-14        

 

                          Serum or plasma urea nitrogen measurement (mass/volume

)           16 mg/dL      

                                        7-18        

 

                          Serum or plasma creatinine measurement (mass/volume)  

         0.93 mg/dL       

                                        0.60-1.30        

 

                    Serum or plasma urea nitrogen/creatinine mass ratio         

  17                  NRG 

       

 

                                        Serum or plasma creatinine measurement w

ith calculation of estimated glomerular 

filtration rate           58                        NRG        

 

                    Serum or plasma glucose measurement (mass/volume)           

108 mg/dL           

        

 

                    Serum or plasma calcium measurement (mass/volume)           

9.3 mg/dL           

8.5-10.1        

 

                          Serum or plasma total bilirubin measurement (mass/volu

me)           0.3 mg/dL   

                                        0.1-1.0        

 

                                        Serum or plasma alkaline phosphatase isac

surement (enzymatic activity/volume)    

                          63 U/L                            

 

                                        Serum or plasma aspartate aminotransfera

se measurement (enzymatic 

activity/volume)           21 U/L                    5-34        

 

                                        Serum or plasma alanine aminotransferase

 measurement (enzymatic activity/volume)

                          15 U/L                    0-55        

 

                    Serum or plasma protein measurement (mass/volume)           

6.0 g/dL            

6.4-8.2        

 

                    Serum or plasma albumin measurement (mass/volume)           

4.0 g/dL            

3.2-4.5        

 

                    CALCIUM CORRECTED           9.3 mg/dL           8.5-10.1    

    

 

                                        TROPONIN I FS - 20 23:30         

 

                    TROPONIN I FS           < 0.30              <0.30        

 

                                        PT panel in platelet poor plasma by coag

ulation assay - 20 23:30         

 

                          Prothrombin time (PT) in platelet poor plasma by coagu

lation assay           

13.5 s                                  12.2-14.7        

 

                          INR in platelet poor plasma or blood by coagulation as

say           1.0         

                                        0.8-1.4        

 

                                        Activated partial thromboplastin time (a

PTT) in platelet poor plasma 

bycoagulation assay - 20 23:30         

 

                                        Activated partial thromboplastin time (a

PTT) in platelet poor plasma 

bycoagulation assay           26 s                      24-35        

 

                                        Fibrin D-dimer FEU measurement in platel

et poor plasma (mass/volume) - 20 

23:30         

 

                          Fibrin D-dimer FEU measurement in platelet poor plasma

 (mass/volume)           

0.66 ug/mL                              0.00-0.49        

 

                                        CMP - 20 08:11         

 

                    GLUCOSE             93 mg/dL            65-99        

 

                    UREA NITROGEN (BUN)           12 mg/dL            7-25      

  

 

                    CREATININE           0.87 mg/dL           0.60-0.88        

 

                    eGFR NON-AFR. AMERICAN           62 mL/min/1.73m2           

> OR = 60        

 

                    eGFR            72 mL/min/1.73m2           >

 OR = 60        

 

                    BUN/CREATININE RATIO           NOT APPLICABLE (calc)        

   6-22        

 

                    SODIUM              140 mmol/L           135-146        

 

                    POTASSIUM           4.4 mmol/L           3.5-5.3        

 

                    CHLORIDE            105 mmol/L                   

 

                    CARBON DIOXIDE           26 mmol/L           20-32        

 

                    CALCIUM             9.2 mg/dL           8.6-10.4        

 

                    PROTEIN, TOTAL           6.0 g/dL            6.1-8.1        

 

                    ALBUMIN             4.1 g/dL            3.6-5.1        

 

                    GLOBULIN            1.9 g/dL (calc)           1.9-3.7       

 

 

                    ALBUMIN/GLOBULIN RATIO           2.2 (calc)           1.0-2.

5        

 

                    BILIRUBIN, TOTAL           0.7 mg/dL           0.2-1.2      

  

 

                    ALKALINE PHOSPHATASE           67 U/L                 

     

 

                    AST                 19 U/L              10-35        

 

                    ALT                 21 U/L              6-29        

 

                                        CBC - 20 08:11         

 

                    WHITE BLOOD CELL COUNT           4.4 Thousand/uL           3

.8-10.8        

 

                    RED BLOOD CELL COUNT           4.52 Million/uL           3.8

0-5.10        

 

                    HEMOGLOBIN           14.1 g/dL           11.7-15.5        

 

                    HEMATOCRIT           43.4 %              35.0-45.0        

 

                    MCV                 96.0 fL             80.0-100.0        

 

                    MCH                 31.2 pg             27.0-33.0        

 

                    MCHC                32.5 g/dL           32.0-36.0        

 

                    RDW                 12.6 %              11.0-15.0        

 

                    PLATELET COUNT           190 Thousand/uL           140-400  

      

 

                    MPV                 11.0 fL             7.5-12.5        

 

                    ABSOLUTE NEUTROPHILS           2785 cells/uL           1500-

7800        

 

                    ABSOLUTE LYMPHOCYTES           1131 cells/uL           850-3

900        

 

                    ABSOLUTE MONOCYTES           321 cells/uL           200-950 

       

 

                    ABSOLUTE EOSINOPHILS           132 cells/uL           

        

 

                    ABSOLUTE BASOPHILS           31 cells/uL           0-200    

    

 

                    NEUTROPHILS           63.3 %              NRG        

 

                    LYMPHOCYTES           25.7 %              NRG        

 

                    MONOCYTES           7.3 %               NRG        

 

                    EOSINOPHILS           3.0 %               NRG        

 

                    BASOPHILS           0.7 %               NRG        

 

                                        VITAMIN D, 25-H - 20 15:36        

 

 

                    VITAMIN D,25-OH,TOTAL,IA           45 ng/mL            30-10

0        



                                                                                
                  



Encounters

      



                ACCT No.           Visit Date/Time           Discharge          

 Status         

             Pt. Type           Provider           Facility           Loc./Unit 

          

Complaint        

 

             [id number]           2020 10:30:00                        [facility]    

       

[facility]     

                                                 

 

             [id number]           2020 14:30:00                              

       Document

 Registration                                                                   

 

 

             [id number]           2020 08:00:00                              

       Document

 Registration                                                                   

 

 

             [id number]           2019 09:30:00                              

       Document

 Registration                                                                   

 

 

             [id number]           2019 08:15:00                              

       Document

 Registration                                                                   

 

 

                    [id number]           2020 14:09:00           

020 23:59:59        

                CLS             Outpatient           SELF REBEKA HAQUE           

[facility]           RAD                       ASYMPTOMATIC MENOPAUSAL

 [place]      

  

 

                    [id number]           2020 23:07:00            02:03:00        

                DIS             Emergency           MALORIE MORENO MD          

 [facility]           ER FS                     HYPERTENSION        

 

                    [id number]           2020 13:25:00            00:01:00        

                DIS             Outpatient           HÉCTOR HAQUE, CONG WINN        

   [facility]           CARD                      TIA,CVA        

 

                    [id number]           2020 12:56:00            14:25:00        

                DIS             Emergency           ROMY HAQUE, KAT CORTES          

 [facility]           ER FS                     BLURRY VISION,THINKS SH

E MAY BE 

HAVING A STROKE        

 

                    [id number]           2020 13:21:00            11:41:00        

                DIS             Outpatient           VAIBHAV VELA DO           

[facility]           4TH                       TIA        

 

                    [id number]           2019 12:59:00            13:58:00        

                DIS             Emergency           YAHIR OLIVER DO         

  [facility]           ER FS                     RT FOOT INJ        

 

                    [id number]           2019 16:27:00            18:50:00        

                DIS             Emergency           IGGY HAQUE, WILDA Llamas

[facility]           ER FS                     DIZZY        

 

                    [id number]           2019 15:36:00            17:46:00        

                DIS             Emergency           CALLI NIETO         

  [facility]           ER                        POSS TIA/BLURRY VISION 

       

 

             [id number]           08/15/2020 08:55:00                        A

CT           

Emergency                 TIFFANIE HAQUE, MALORIE GUTIERRES           [facility]                 ER FS                     FEELS LIKE BP IS HIGH;BLURRY

 VISION

## 2020-08-18 ENCOUNTER — HOSPITAL ENCOUNTER (OUTPATIENT)
Dept: HOSPITAL 75 - RAD FS | Age: 80
End: 2020-08-18
Attending: FAMILY MEDICINE
Payer: MEDICARE

## 2020-08-18 DIAGNOSIS — M25.551: Primary | ICD-10-CM

## 2020-08-18 PROCEDURE — 73502 X-RAY EXAM HIP UNI 2-3 VIEWS: CPT

## 2020-08-18 NOTE — DIAGNOSTIC IMAGING REPORT
INDICATION: 

Right hip pain.



TIME OF EXAM: 

4:29 PM.



TECHNIQUE: 

Two views of the right hip were obtained.



FINDINGS:

The femoroacetabular alignment is normal. The joint space is

maintained. There is some spurring along the superolateral

acetabulum. The femoral head and neck are intact. No fractures

are seen. The right-sided rami are intact.



IMPRESSION: 

No acute bony abnormality is detected.



Dictated by: 



  Dictated on workstation # [id number]

## 2021-04-02 ENCOUNTER — HOSPITAL ENCOUNTER (EMERGENCY)
Dept: HOSPITAL 75 - ER FS | Age: 81
Discharge: HOME | End: 2021-04-02
Payer: MEDICARE

## 2021-04-02 VITALS — SYSTOLIC BLOOD PRESSURE: 117 MMHG | DIASTOLIC BLOOD PRESSURE: 95 MMHG

## 2021-04-02 VITALS — BODY MASS INDEX: 23.01 KG/M2 | WEIGHT: 129.85 LBS | HEIGHT: 62.99 IN

## 2021-04-02 DIAGNOSIS — Z88.0: ICD-10-CM

## 2021-04-02 DIAGNOSIS — Z88.8: ICD-10-CM

## 2021-04-02 DIAGNOSIS — Z79.82: ICD-10-CM

## 2021-04-02 DIAGNOSIS — Z98.51: ICD-10-CM

## 2021-04-02 DIAGNOSIS — R00.2: Primary | ICD-10-CM

## 2021-04-02 DIAGNOSIS — R06.02: ICD-10-CM

## 2021-04-02 DIAGNOSIS — R07.89: ICD-10-CM

## 2021-04-02 DIAGNOSIS — R60.0: ICD-10-CM

## 2021-04-02 DIAGNOSIS — I10: ICD-10-CM

## 2021-04-02 DIAGNOSIS — Z86.73: ICD-10-CM

## 2021-04-02 LAB
ALBUMIN SERPL-MCNC: 3.9 GM/DL (ref 3.2–4.5)
ALP SERPL-CCNC: 72 U/L (ref 40–136)
ALT SERPL-CCNC: 15 U/L (ref 0–55)
APTT BLD: 25 SEC (ref 24–35)
BASOPHILS # BLD AUTO: 0 10^3/UL (ref 0–0.1)
BASOPHILS NFR BLD AUTO: 1 % (ref 0–10)
BILIRUB SERPL-MCNC: 0.2 MG/DL (ref 0.1–1)
BUN/CREAT SERPL: 19
CALCIUM SERPL-MCNC: 9.4 MG/DL (ref 8.5–10.1)
CHLORIDE SERPL-SCNC: 105 MMOL/L (ref 98–107)
CO2 SERPL-SCNC: 25 MMOL/L (ref 21–32)
CREAT SERPL-MCNC: 0.85 MG/DL (ref 0.6–1.3)
EOSINOPHIL # BLD AUTO: 0.2 10^3/UL (ref 0–0.3)
EOSINOPHIL NFR BLD AUTO: 3 % (ref 0–10)
GFR SERPLBLD BASED ON 1.73 SQ M-ARVRAT: > 60 ML/MIN
GLUCOSE SERPL-MCNC: 154 MG/DL (ref 70–105)
HCT VFR BLD CALC: 41 % (ref 35–52)
HGB BLD-MCNC: 13.2 G/DL (ref 11.5–16)
INR PPP: 0.9 (ref 0.8–1.4)
LYMPHOCYTES # BLD AUTO: 1.3 X 10^3 (ref 1–4)
LYMPHOCYTES NFR BLD AUTO: 21 % (ref 12–44)
MAGNESIUM SERPL-MCNC: 2 MG/DL (ref 1.6–2.4)
MANUAL DIFFERENTIAL PERFORMED BLD QL: NO
MCH RBC QN AUTO: 30 PG (ref 25–34)
MCHC RBC AUTO-ENTMCNC: 32 G/DL (ref 32–36)
MCV RBC AUTO: 93 FL (ref 80–99)
MONOCYTES # BLD AUTO: 0.5 X 10^3 (ref 0–1)
MONOCYTES NFR BLD AUTO: 9 % (ref 0–12)
NEUTROPHILS # BLD AUTO: 4.1 X 10^3 (ref 1.8–7.8)
NEUTROPHILS NFR BLD AUTO: 68 % (ref 42–75)
PLATELET # BLD: 193 10^3/UL (ref 130–400)
PMV BLD AUTO: 10.5 FL (ref 7.4–10.4)
POTASSIUM SERPL-SCNC: 4 MMOL/L (ref 3.6–5)
PROT SERPL-MCNC: 6.2 GM/DL (ref 6.4–8.2)
PROTHROMBIN TIME: 12.9 SEC (ref 12.2–14.7)
SODIUM SERPL-SCNC: 140 MMOL/L (ref 135–145)
WBC # BLD AUTO: 6.1 10^3/UL (ref 4.3–11)

## 2021-04-02 PROCEDURE — 93005 ELECTROCARDIOGRAM TRACING: CPT

## 2021-04-02 PROCEDURE — 80053 COMPREHEN METABOLIC PANEL: CPT

## 2021-04-02 PROCEDURE — 36415 COLL VENOUS BLD VENIPUNCTURE: CPT

## 2021-04-02 PROCEDURE — 93041 RHYTHM ECG TRACING: CPT

## 2021-04-02 PROCEDURE — 85730 THROMBOPLASTIN TIME PARTIAL: CPT

## 2021-04-02 PROCEDURE — 85610 PROTHROMBIN TIME: CPT

## 2021-04-02 PROCEDURE — 84484 ASSAY OF TROPONIN QUANT: CPT

## 2021-04-02 PROCEDURE — 85025 COMPLETE CBC W/AUTO DIFF WBC: CPT

## 2021-04-02 PROCEDURE — 83735 ASSAY OF MAGNESIUM: CPT

## 2021-04-02 PROCEDURE — 83874 ASSAY OF MYOGLOBIN: CPT

## 2021-04-02 PROCEDURE — 71045 X-RAY EXAM CHEST 1 VIEW: CPT

## 2021-04-02 RX ADMIN — ASPIRIN 81 MG CHEWABLE TABLET ONE MG: 81 TABLET CHEWABLE at 16:29

## 2021-04-02 RX ADMIN — ASPIRIN 81 MG CHEWABLE TABLET ONE MG: 81 TABLET CHEWABLE at 16:35

## 2021-04-02 NOTE — ED CARDIAC GENERAL
History of Present Illness


General


Chief Complaint:  Cardiac/General Problems


Stated Complaint:  LOW HEART RATE


Source:  patient


Exam Limitations:  no limitations


 (IBETH VILLARREAL,)





History of Present Illness


Date Seen by Provider:  2021


Time Seen by Provider:  15:57


Initial Comments


Ms. Luna is an 80-year-old female who presented to the  ED for low heart 

rate. She states she has been dizzy off and on for the last few days but also 

had some chest tightness. She then placed a pulse-ox on her finger and it showed

a heart rate of 44. She then presented to the ED. She states she had shortness 

of breath briefly but it has resolved. Denied fevers/chills, nausea, vomiting, 

weakness on one side, blurred vision, difficulties with urination.


Timing/Duration:  4-6 hours


Severity:  mild


Location:  central


NTG SL PTA:  No


ASA po PTA:  No


Associated Systoms:  No Fever/Chills, No Nausea/Vomiting, No Shortness of Air, 

No Syncope, No Weakness (IBETH VILLARREAL,)


Timing/Duration:  4-6 hours, changing over time, gone now


Severity:  mild


Location:  central ([facility]ight[facility])


Prior CP/Workup:  echocardiography


NTG SL PTA:  No


ASA po PTA:  No (On schedule 3xweekly)


 (TALAT KU MD)





Allergies and Home Medications


Allergies


Coded Allergies:  


     Penicillins (Unverified  Allergy, Unknown, 20)


     Statins-Hmg-Coa Reductase Inhibitor (Verified  Allergy, Unknown, 20)


   per patient report


     diphenhydramine (Verified  Allergy, Unknown, 19)





Home Medications


Ascorbate Calcium 500 Mg Tablet, 500 MG PO DAILY, (Reported)


Aspirin 81 Mg Tablet.dr, 81 MG PO DAILY


   Prescribed by: VAIBHAV VELA on 20 1142


Cholecalciferol (Vitamin D3) 2,000 Unit Tablet, 2,000 UNIT PO DAILY, (Reported)


Latanoprost 2.5 Ml Drops, 1 DROP OU HS, (Reported)


Meclizine HCl 25 Mg Tablet, 25 MG PO TID PRN for DIZZINESS, (Reported)


Metoprolol Tartrate 25 Mg Tablet, 25 MG PO BID, (Reported)


Multivitamin 1 Each Tablet, 1 TAB PO DAILY, (Reported)





Patient Home Medication List


Home Medication List Reviewed:  Yes


 (IBETH VILLARREAL,)


Home Medication List Reviewed:  Yes


 (TALAT KU MD)





Review of Systems


Review of Systems


Constitutional:  No chills, No fever


EENTM:  No Blurred Vision, No Double Vision


Respiratory:  Denies Cough; Shortness of Air


Cardiovascular:  Denies Chest Pain; Palpitations


Gastrointestinal:  Denies Nausea, Denies Vomiting


Genitourinary:  Denies Burning, Denies Frequency


Musculoskeletal:  No back pain, No joint pain


Skin:  no symptoms reported


Psychiatric/Neurological:  Denies Headache, Denies Weakness


Endocrine:  No Symptoms Reported


Hematologic/Lymphatic:  No Symptoms Reported (IBETH VILLARREAL,)





All Other Systems Reviewed


Negative Unless Noted:  Yes


 (TALAT KU MD)





Past Medical-Social-Family Hx


Past Med/Social Hx:  Reviewed Nursing Past Med/Soc Hx


 (TALAT KU MD)


Patient Social History


Alcohol Use:  Denies Use


Smoking Status:  Never a Smoker


2nd Hand Smoke Exposure:  No


Recent Hopitalizations:  No


 (IBETH VILLARREAL,)





Immunizations Up To Date


Date of Pneumonia Vaccine:  Oct 1, 2018


 (IBETH VILLARREAL,)





Seasonal Allergies


Seasonal Allergies:  No


 (IBETH VILLARREAL,)





Past Medical History


Surgeries:  Yes (HERNIA)


Tubal Ligation


Respiratory:  No


Cardiac:  Yes


Hypertension


Neurological:  Yes


TIA


GYN History:  Tubal Ligation


Genitourinary:  No


Gastrointestinal:  No


Musculoskeletal:  No


Endocrine:  No


HEENT:  No


Glaucoma


Cancer:  No


Psychosocial:  No


Integumentary:  No


Blood Disorders:  No


Adverse Reaction/Blood Tranf:  No


 (IBETH VILLARREAL,)





Family Medical History


Reviewed Nursing Family Hx


 (IBETH VILLARREAL,)


Reviewed Nursing Family Hx


 (TALAT KU MD)


No Pertinent Family Hx


 (IBETH VILLARREAL,)





Physical Exam


Vital Signs





Vital Signs - First Documented























 (TALAT KU MD)


Vital Signs


Capillary Refill :  


 (IBETH VILLARREAL,)


Height, Weight, BMI


Height: 5'2.00"


Weight: 142lbs. oz. 64.464793ll; 26.00 BMI


Method:Stated


General Appearance:  No Apparent Distress, WD/WN


HEENT:  PERRL/EOMI


Neck:  Non Tender, Supple


Respiratory:  Lungs Clear, Normal Breath Sounds


Cardiovascular:  No Murmur, Extra Beats (bigeminy)


Gastrointestinal:  Normal Bowel Sounds, Non Tender, Soft


Extremity:  No Calf Tenderness, Pedal Edema


Neurologic/Psychiatric:  Alert, Oriented x3


Skin:  Normal Color, Warm/Dry (IBETH VILLARREAL,)


General Appearance:  No Apparent Distress, WD/WN


Respiratory:  Lungs Clear, Normal Breath Sounds


Cardiovascular:  No Murmur; No Bradycardia; Extra Beats (bigeminy); No 

Tachycardia


Extremity:  Normal Range of Motion, No Calf Tenderness, Pedal Edema


Neurologic/Psychiatric:  Alert, Oriented x3


Skin:  Normal Color, Warm/Dry (TALAT KU MD)





Progress/Results/Core Measures


Results/Orders


Lab Results





Laboratory Tests








Test


 21


16:15 21


18:11 Range/Units


 


 


White Blood Count


 6.1 


 


 4.3-11.0


10^3/uL


 


Red Blood Count


 4.38 


 


 4.35-5.85


10^6/uL


 


Hemoglobin 13.2   11.5-16.0  G/DL


 


Hematocrit 41   35-52  %


 


Mean Corpuscular Volume 93   80-99  FL


 


Mean Corpuscular Hemoglobin 30   25-34  PG


 


Mean Corpuscular Hemoglobin


Concent 32 


 


 32-36  G/DL





 


Red Cell Distribution Width 14.1   10.0-14.5  %


 


Platelet Count


 193 


 


 130-400


10^3/uL


 


Mean Platelet Volume 10.5 H  7.4-10.4  FL


 


Neutrophils (%) (Auto) 68   42-75  %


 


Lymphocytes (%) (Auto) 21   12-44  %


 


Monocytes (%) (Auto) 9   0-12  %


 


Eosinophils (%) (Auto) 3   0-10  %


 


Basophils (%) (Auto) 1   0-10  %


 


Neutrophils # (Auto) 4.1   1.8-7.8  X 10^3


 


Lymphocytes # (Auto) 1.3   1.0-4.0  X 10^3


 


Monocytes # (Auto) 0.5   0.0-1.0  X 10^3


 


Eosinophils # (Auto)


 0.2 


 


 0.0-0.3


10^3/uL


 


Basophils # (Auto)


 0.0 


 


 0.0-0.1


10^3/uL


 


Prothrombin Time 12.9   12.2-14.7  SEC


 


INR Comment 0.9   0.8-1.4  


 


Activated Partial


Thromboplast Time 25 


 


 24-35  SEC





 


Sodium Level 140   135-145  MMOL/L


 


Potassium Level 4.0   3.6-5.0  MMOL/L


 


Chloride Level 105     MMOL/L


 


Carbon Dioxide Level 25   21-32  MMOL/L


 


Anion Gap 10   5-14  MMOL/L


 


Blood Urea Nitrogen 16   7-18  MG/DL


 


Creatinine


 0.85 


 


 0.60-1.30


MG/DL


 


Estimat Glomerular Filtration


Rate > 60 


 


  





 


BUN/Creatinine Ratio 19    


 


Glucose Level 154 H    MG/DL


 


Calcium Level 9.4   8.5-10.1  MG/DL


 


Corrected Calcium 9.5   8.5-10.1  MG/DL


 


Magnesium Level 2.0   1.6-2.4  MG/DL


 


Total Bilirubin 0.2   0.1-1.0  MG/DL


 


Aspartate Amino Transf


(AST/SGOT) 20 


 


 5-34  U/L





 


Alanine Aminotransferase


(ALT/SGPT) 15 


 


 0-55  U/L





 


Alkaline Phosphatase 72     U/L


 


Myoglobin


 59.6 


 


 10.0-92.0


NG/ML


 


Troponin I < 0.30  < 0.30  <0.30  NG/ML


 


Total Protein 6.2 L  6.4-8.2  GM/DL


 


Albumin 3.9   3.2-4.5  GM/DL





 (TALAT KU MD)


My Orders





Orders - TALAT KU MD


Cbc With Automated Diff (21 16:07)


Magnesium (21 16:07)


Chest 1 View Ap/Pa Only (21 16:07)


Ekg Tracing (21 16:07)


Comprehensive Metabolic Panel (21 16:07)


Myoglobin Serum (21 16:07)


Protime With Inr (21 16:07)


Partial Thromboplastin Time (21 16:07)


O2 (21 16:07)


Monitor-Rhythm Ecg Trace Only (21 16:07)


Lipid Panel (4/3/21 06:00)


Aspirin Chewable Tablet (Baby Aspirin Ch (21 16:15)


Ed Iv/Invasive Line Start (21 16:07)


Troponin I Fs (21 16:07)


Ns Iv 500 Ml (Sodium Chloride 0.9%) (21 16:15)


Troponin I Fs (21 18:30)


 (TALAT KU MD)


Medications Given in ED





Current Medications








 Medications  Dose


 Ordered  Sig/Juliann


 Route  Start Time


 Stop Time Status Last Admin


Dose Admin


 


 Sodium Chloride  500 ml @ 0


 mls/hr  Q0M ONCE


 IV  21 16:15


 21 16:16 DC 21 16:29


1,000 MLS/HR





 (TALAT KU MD)


Vital Signs/I&O











 21





 16:00 16:00


 


Temp 36.7 


 


Pulse 103 


 


Resp 18 


 


B/P (MAP) 133/63 (86) 


 


Pulse Ox 100 


 


O2 Delivery Room Air Room Air





 (TALAT KU MD)





Progress


Progress Note :  


   Time:  16:20


Progress Note


Chest pain protocol initiated. Will start 500cc NS. EKG revealed PACs, reviewed 

by Dr. Ku. Patient declined ASA due to history of stomach ulcers and prior

bleeding issue.


 (IBETH VILLARREAL,)


Progress Note :  


Progress Note


I have seen and evaluated the patient and agree with above except as indicated. 

I have directed the plan of care.  Patient is here with report of concerns of 

low heart rate as noted on pulse oximetry.  Oxygen saturations were okay.  She 

did have some central chest tightness occasionally and flutter but overall that 

has resolved now.  No recent episodes of chest pain otherwise.  We will initiate

chest pain protocol on 500 mL of normal saline IV.  Monitor patient.  1820: 

Repeat troponin ordered as first set was negative and patient remains chest 

pain-free with heart rate in the 80s with occasional PACs.  1856: Repeat 

troponin negative and occasional PACs still noted with normal blood pressure and

no chest pain.  Think at this point it is safe to discharge her home and she can

follow-up with her primary care doctor and with cardiology.  I did give her the 

name and numbers for both local cardiologist and she will call for appointment. 

Discharged home with return precautions.  Patient verbalized understanding of 

instructions and agreement with plan.


 (TALAT KU MD)


Initial ECG Impression Date:  2021


Initial ECG Impression Time:  16:01


Initial ECG Rate:  86


Initial ECG Rhythm:  PAC


Comment


Sinus with PACs, normal axis, no evidence of ST elevation MI. Interpreted by Dr. Ku.


 (IBETH VILLARREAL,)


Initial ECG Comparisson:  Unchanged


Comment


As above.  Morphology essentially unchanged from 2020 except for now a few 

PACs.  Interpreted by me.


 (TALAT KU MD)


Diagnostic Imaging





   Diagonstic Imaging:  Xray


   Plain Films/CT/US/NM/MRI:  chest


Comments


NAME:   ADALBERTO LUNA REC#:   [id number]


ACCOUNT#:   [id number]


PT STATUS:   REG ER


:   1940


PHYSICIAN:   TALAT KU MD


ADMIT DATE:   21/ER FS


                                   ***Draft***


Date of Exam:21





CHEST 1 VIEW AP/PA ONLY








EXAMINATION: 


Chest radiograph, portable AP view.





DATE: 


2021 4:24 PM.





INDICATION: 


80-year-old female, chest pain.





COMPARISON: 


2020.





FINDINGS:


The heart size and mediastinal contours are unchanged. There is


no identified pneumothorax. There is no large pleural effusion.


There is no identified focal airspace consolidation.





IMPRESSION: 


No identified acute cardiopulmonary abnormality.








  Dictated on workstation # [id number]








Dict:   21 1627


Trans:   21 1629


 [id number]





Interpreted by:     LEROY HERNANDEZ MD


Electronically signed by:


   Reviewed:  Reviewed by Me


 (IBETH VILLARREAL,)





Departure


Impression





   Primary Impression:  


   Palpitations


   Additional Impression:  


   Chest pain


   Qualified Codes:  R07.9 - Chest pain, unspecified


Disposition:  01 HOME, SELF-CARE


Condition:  Improved





Departure-Patient Inst.


Decision time for Depature:  18:58


 (TALAT KU MD)


Referrals:  


RENE MARSH MD FACP FACC CCDS





DILAN SOLITARIO MD, MAXWELL MD (PCP/Family)


Primary Care Physician


Patient Instructions:  Chest Pain (DC), Palpitations (DC)





Add. Discharge Instructions:  








All discharge instructions reviewed with patient and/or family. Voiced 

understanding.





Continue home medications as previously prescribed.  Follow-up with heart doctor

as discussed.  You may call and make appointment with either one of the 2 

cardiologist noted above or per your preference.  Return for worse pain, fever, 

vomiting, weakness, breathing problems or other concerns as needed.











IBETH VILLARREAL,          2021 16:20


TALAT KU MD           2021 18:57

## 2021-04-02 NOTE — DIAGNOSTIC IMAGING REPORT
EXAMINATION: 

Chest radiograph, portable AP view.



DATE: 

04/02/2021 4:24 PM.



INDICATION: 

80-year-old female, chest pain.



COMPARISON: 

April 25, 2020.



FINDINGS:

The heart size and mediastinal contours are unchanged. There is

no identified pneumothorax. There is no large pleural effusion.

There is no identified focal airspace consolidation.



IMPRESSION: 

No identified acute cardiopulmonary abnormality.



Dictated by: 



  Dictated on workstation # [id number]

## 2021-08-02 ENCOUNTER — HOSPITAL ENCOUNTER (EMERGENCY)
Dept: HOSPITAL 75 - ER FS | Age: 81
Discharge: HOME | End: 2021-08-02
Payer: MEDICARE

## 2021-08-02 VITALS — BODY MASS INDEX: 25.27 KG/M2 | WEIGHT: 137.35 LBS | HEIGHT: 61.97 IN

## 2021-08-02 VITALS — DIASTOLIC BLOOD PRESSURE: 71 MMHG | SYSTOLIC BLOOD PRESSURE: 151 MMHG

## 2021-08-02 DIAGNOSIS — Z79.899: ICD-10-CM
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
## 2021-08-02 DIAGNOSIS — I10: ICD-10-CM

## 2021-08-02 DIAGNOSIS — Z86.73: ICD-10-CM

## 2021-08-02 DIAGNOSIS — H40.9: ICD-10-CM

## 2021-08-02 DIAGNOSIS — Z79.82: ICD-10-CM

## 2021-08-02 DIAGNOSIS — I83.91: Primary | ICD-10-CM

## 2021-08-02 DIAGNOSIS — R07.89: ICD-10-CM

## 2021-08-02 DIAGNOSIS — T25.231A: ICD-10-CM

## 2021-08-02 LAB
ALBUMIN SERPL-MCNC: 4 GM/DL (ref 3.2–4.5)
ALP SERPL-CCNC: 75 U/L (ref 40–136)
ALT SERPL-CCNC: 19 U/L (ref 0–55)
BASOPHILS # BLD AUTO: 0.1 10^3/UL (ref 0–0.1)
BASOPHILS NFR BLD AUTO: 1 % (ref 0–10)
BILIRUB SERPL-MCNC: 0.3 MG/DL (ref 0.1–1)
BUN/CREAT SERPL: 21
CALCIUM SERPL-MCNC: 9.5 MG/DL (ref 8.5–10.1)
CHLORIDE SERPL-SCNC: 102 MMOL/L (ref 98–107)
CO2 SERPL-SCNC: 21 MMOL/L (ref 21–32)
CREAT SERPL-MCNC: 0.87 MG/DL (ref 0.6–1.3)
EOSINOPHIL # BLD AUTO: 0.1 10^3/UL (ref 0–0.3)
EOSINOPHIL NFR BLD AUTO: 1 % (ref 0–10)
GFR SERPLBLD BASED ON 1.73 SQ M-ARVRAT: 62 ML/MIN
GLUCOSE SERPL-MCNC: 112 MG/DL (ref 70–105)
HCT VFR BLD CALC: 41 % (ref 35–52)
HGB BLD-MCNC: 14 G/DL (ref 11.5–16)
LYMPHOCYTES # BLD AUTO: 1.4 X 10^3 (ref 1–4)
LYMPHOCYTES NFR BLD AUTO: 16 % (ref 12–44)
MANUAL DIFFERENTIAL PERFORMED BLD QL: NO
MCH RBC QN AUTO: 31 PG (ref 25–34)
MCHC RBC AUTO-ENTMCNC: 34 G/DL (ref 32–36)
MCV RBC AUTO: 90 FL (ref 80–99)
MONOCYTES # BLD AUTO: 0.6 X 10^3 (ref 0–1)
MONOCYTES NFR BLD AUTO: 7 % (ref 0–12)
NEUTROPHILS # BLD AUTO: 6.6 X 10^3 (ref 1.8–7.8)
NEUTROPHILS NFR BLD AUTO: 76 % (ref 42–75)
PLATELET # BLD: 204 10^3/UL (ref 130–400)
PMV BLD AUTO: 11.3 FL (ref 7.4–10.4)
POTASSIUM SERPL-SCNC: 4.6 MMOL/L (ref 3.6–5)
PROT SERPL-MCNC: 6.4 GM/DL (ref 6.4–8.2)
SODIUM SERPL-SCNC: 135 MMOL/L (ref 135–145)
WBC # BLD AUTO: 8.8 10^3/UL (ref 4.3–11)

## 2021-08-02 PROCEDURE — 85025 COMPLETE CBC W/AUTO DIFF WBC: CPT

## 2021-08-02 PROCEDURE — 36415 COLL VENOUS BLD VENIPUNCTURE: CPT

## 2021-08-02 PROCEDURE — 80053 COMPREHEN METABOLIC PANEL: CPT

## 2021-08-02 PROCEDURE — 93005 ELECTROCARDIOGRAM TRACING: CPT

## 2021-08-02 PROCEDURE — 84484 ASSAY OF TROPONIN QUANT: CPT

## 2021-08-02 PROCEDURE — 71045 X-RAY EXAM CHEST 1 VIEW: CPT

## 2021-08-02 NOTE — DIAGNOSTIC IMAGING REPORT
INDICATION: 

Chest pressure.



COMPARISON:  

04/02/2021.



FINDINGS: 

The heart size, mediastinal configuration, and pulmonary

vascularity are within normal limits. There is no pleural

effusion, pneumothorax, or pneumonia. The osseous structures are

unremarkable. 



IMPRESSION: 

No acute cardiopulmonary abnormality.



Dictated by: 



  Dictated on workstation # [id number]

## 2021-08-02 NOTE — ED LOWER EXTREMITY
General


Chief Complaint:  Lower Extremity


Stated Complaint:  RT LEG PAIN,CHEST PRESSURE





History of Present Illness


Date Seen by Provider:  Aug 2, 2021


Time Seen by Provider:  15:49


Initial Comments


81-year-old female presents with right shin pain.  She reports that it has been 

going on and off for at least a month.  She had a little bit of pain earlier 

today but does not have any pain right now.  She has seen Dr. Davila for it.  She 

states that she has some varicose veins.  She reports his neck step would have 

been an x-ray.  She also presents with some "on and off chest pressure that is 

very brief she has had a couple episodes today but has not had any for a little 

while.  She is not having any chest pressure at this time.  She denies any 

nausea vomiting shortness of breath or other symptoms associated with it.  

Patient with no other systemic complaints.





Allergies and Home Medications


Allergies


Coded Allergies:  


     Penicillins (Unverified  Allergy, Unknown, 1/16/20)


     Statins-Hmg-Coa Reductase Inhibitor (Verified  Allergy, Unknown, 1/17/20)


   per patient report


     diphenhydramine (Verified  Allergy, Unknown, 6/26/19)





Home Medications


Ascorbate Calcium 500 Mg Tablet, 500 MG PO DAILY, (Reported)


Aspirin 81 Mg Tablet.dr, 81 MG PO DAILY


   Prescribed by: VAIBHAV VELA on 1/17/20 1142


Cholecalciferol (Vitamin D3) 2,000 Unit Tablet, 2,000 UNIT PO DAILY, (Reported)


Latanoprost 2.5 Ml Drops, 1 DROP OU HS, (Reported)


Meclizine HCl 25 Mg Tablet, 25 MG PO TID PRN for DIZZINESS, (Reported)


Metoprolol Tartrate 25 Mg Tablet, 25 MG PO BID, (Reported)


Multivitamin 1 Each Tablet, 1 TAB PO DAILY, (Reported)





Patient Home Medication List


Home Medication List Reviewed:  Yes





Review of Systems


Constitutional:  see HPI


EENTM:  no symptoms reported


Respiratory:  No cough, No short of breath


Cardiovascular:  see HPI, chest pain


Gastrointestinal:  no symptoms reported


Genitourinary:  no symptoms reported


Musculoskeletal:  see HPI


Skin:  no symptoms reported


Psychiatric/Neurological:  No Symptoms Reported





Past Medical-Social-Family Hx


Seasonal Allergies


Seasonal Allergies:  No





Past Medical History


Surgeries:  Yes (HERNIA)


Tubal Ligation


Respiratory:  No


Cardiac:  Yes


Hypertension


Neurological:  Yes


TIA


GYN History:  Tubal Ligation


Genitourinary:  No


Gastrointestinal:  No


Musculoskeletal:  No


Endocrine:  No


HEENT:  No


Glaucoma


Cancer:  No


Psychosocial:  No


Integumentary:  No


Blood Disorders:  No


Adverse Reaction/Blood Tranf:  No





Family Medical History


No Pertinent Family Hx





Physical Exam


Vital Signs





Vital Signs - First Documented








 8/2/21





 15:50


 


Temp 36.4


 


Pulse 81


 


Resp 16


 


B/P (MAP) 155/74 (101)


 


Pulse Ox 97





Capillary Refill :


Height, Weight, BMI


Height: 5'2.00"


Weight: 142lbs. oz. 64.841257jv; 23.00 BMI


Method:Stated


General Appearance:  WD/WN, no apparent distress


Neck:  full range of motion, supple


Cardiovascular:  normal peripheral pulses, regular rate, rhythm, no edema


Respiratory:  lungs clear, normal breath sounds


Gastrointestinal:  non tender, soft


Hips:  bilateral hip non-tender


Legs:  bilateral leg non-tender


Knees:  bilateral knee non-tender


Ankles:  bilateral ankle non-tender


Feet:  bilateral foot non-tender


Neurologic/Psychiatric:  alert, normal mood/affect, oriented x 3


Skin:  normal color, warm/dry





Progress/Results/Core Measures


Results/Orders


Lab Results





Laboratory Tests








Test


 8/2/21


16:00 Range/Units


 


 


White Blood Count


 8.8 


 4.3-11.0


10^3/uL


 


Red Blood Count


 4.58 


 4.35-5.85


10^6/uL


 


Hemoglobin 14.0  11.5-16.0  G/DL


 


Hematocrit 41  35-52  %


 


Mean Corpuscular Volume 90  80-99  FL


 


Mean Corpuscular Hemoglobin 31  25-34  PG


 


Mean Corpuscular Hemoglobin


Concent 34 


 32-36  G/DL





 


Red Cell Distribution Width 13.2  10.0-14.5  %


 


Platelet Count


 204 


 130-400


10^3/uL


 


Mean Platelet Volume 11.3 H 7.4-10.4  FL


 


Immature Granulocyte % (Auto) 0   %


 


Neutrophils (%) (Auto) 76 H 42-75  %


 


Lymphocytes (%) (Auto) 16  12-44  %


 


Monocytes (%) (Auto) 7  0-12  %


 


Eosinophils (%) (Auto) 1  0-10  %


 


Basophils (%) (Auto) 1  0-10  %


 


Neutrophils # (Auto) 6.6  1.8-7.8  X 10^3


 


Lymphocytes # (Auto) 1.4  1.0-4.0  X 10^3


 


Monocytes # (Auto) 0.6  0.0-1.0  X 10^3


 


Eosinophils # (Auto)


 0.1 


 0.0-0.3


10^3/uL


 


Basophils # (Auto)


 0.1 


 0.0-0.1


10^3/uL


 


Immature Granulocyte # (Auto)


 0.0 


 0.0-0.1


10^3/uL


 


Percent Immature Platelet


Fraction 3.7 


 0.0-7.6  %





 


Sodium Level 135  135-145  MMOL/L


 


Potassium Level 4.6  3.6-5.0  MMOL/L


 


Chloride Level 102    MMOL/L


 


Carbon Dioxide Level 21  21-32  MMOL/L


 


Anion Gap 12  5-14  MMOL/L


 


Blood Urea Nitrogen 18  7-18  MG/DL


 


Creatinine


 0.87 


 0.60-1.30


MG/DL


 


Estimat Glomerular Filtration


Rate 62 


  





 


BUN/Creatinine Ratio 21   


 


Glucose Level 112 H   MG/DL


 


Calcium Level 9.5  8.5-10.1  MG/DL


 


Corrected Calcium 9.5  8.5-10.1  MG/DL


 


Total Bilirubin 0.3  0.1-1.0  MG/DL


 


Aspartate Amino Transf


(AST/SGOT) 22 


 5-34  U/L





 


Alanine Aminotransferase


(ALT/SGPT) 19 


 0-55  U/L





 


Alkaline Phosphatase 75    U/L


 


Troponin I 0.30  <0.30  NG/ML


 


Total Protein 6.4  6.4-8.2  GM/DL


 


Albumin 4.0  3.2-4.5  GM/DL








My Orders





Orders - CARMITA SALVADOR L DO


Cbc With Automated Diff (8/2/21 16:09)


Comprehensive Metabolic Panel (8/2/21 16:09)


Troponin I Fs (8/2/21 16:09)


Ekg Tracing (8/2/21 16:10)


Monitor-Rhythm Ecg Trace Only (8/2/21 16:10)


Chest 1 View Ap/Pa Only (8/2/21 16:14)





Vital Signs/I&O











 8/2/21 8/2/21





 15:50 17:15


 


Temp 36.4 36.4


 


Pulse 81 80


 


Resp 16 16


 


B/P (MAP) 155/74 (101) 151/71 (101)


 


Pulse Ox 97 97











Progress


Progress Note :  


Progress Note


Patient has an appointment with Dr. Davila tomorrow morning.  Patient with no 

acute findings on exam or on her work-up.  Her symptoms have been, longstanding.

 She can continue her outpatient work-up tomorrow morning.  Patient stable and 

discharged home


Initial ECG Impression Date:  Aug 2, 2021


Initial ECG Impression Time:  15:54


Initial ECG Rate:  73


Initial ECG Rhythm:  Normal Sinus


Initial ECG Intervals:  Normal


Initial ECG Impression:  Nonspecific Changes





Departure


Impression





   Primary Impression:  


   Varicose vein of leg


   Qualified Codes:  I83.91 - Asymptomatic varicose veins of right lower 

   extremity


   Additional Impressions:  


   Pain in right lower leg


   Chest discomfort


   2nd deg burn toe


Disposition:  01 HOME, SELF-CARE


Condition:  Stable





Departure-Patient Inst.


Referrals:  


SELF,REBEKA HAQUE (PCP/Family)


Primary Care Physician


Patient Instructions:  Varicose Veins and Other Vein Disease in the Legs





Add. Discharge Instructions:  


Keep your already scheduled appointment with Dr. Rosas in the morning for 

further evaluation


Tylenol ibuprofen as needed





All discharge instructions reviewed with patient and/or family. Voiced 

understanding.











CARMITA SALVADOR DO                Aug 2, 2021 15:49

## 2021-08-10 ENCOUNTER — HOSPITAL ENCOUNTER (OUTPATIENT)
Dept: HOSPITAL 75 - RAD | Age: 81
End: 2021-08-10
Attending: FAMILY MEDICINE
Payer: MEDICARE

## 2021-08-10 DIAGNOSIS — M89.8X6: Primary | ICD-10-CM

## 2021-08-10 NOTE — DIAGNOSTIC IMAGING REPORT
INDICATION:  

Mid tibia pain.



EXAMINATION:    

Right lower extremity MRI without contrast on 08/10/2021.



FINDINGS:

There is a linear focus of T2 hyperintensity within the

intramedullary canal of the distal metadiaphysis of the tibia

which is nonspecific. This lies distal to the marker at the site

of pain. This is transverse in nature and could represent a focus

of nondisplaced insufficiency fracture. No surrounding edema,

however, is appreciated. Correlation with radiographs and CT may

provide further characterization. Otherwise, a prominent nutrient

foramen is possible. The remaining osseous structures are

unremarkable. No displaced fractures are seen. The joint spaces

appear preserved. The soft tissues are unremarkable. The

visualized tendons are intact.



IMPRESSION:

Nonspecific transverse T2 hyperintensity distal metadiaphysis of

the tibia, see above discussion and recommendations. 





Dictated by: 



  Dictated on workstation # [id number]

## 2021-08-17 ENCOUNTER — HOSPITAL ENCOUNTER (OUTPATIENT)
Dept: HOSPITAL 75 - RAD FS | Age: 81
End: 2021-08-17
Attending: NURSE PRACTITIONER
Payer: MEDICARE

## 2021-08-17 DIAGNOSIS — S82.301A: Primary | ICD-10-CM
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
## 2021-08-17 PROCEDURE — 73590 X-RAY EXAM OF LOWER LEG: CPT

## 2021-08-17 PROCEDURE — 73610 X-RAY EXAM OF ANKLE: CPT

## 2021-08-17 NOTE — DIAGNOSTIC IMAGING REPORT
INDICATION: Pain of the right lower leg. Abnormal MRI. Possible

fracture. 



COMPARISON: MRI dated 08/10/2021.



FINDINGS: Three views of the right tibia and fibula were obtained

and show no fractures, dislocations, or other acute bony

abnormalities. Joint spaces are well maintained throughout. The

soft tissues appear unremarkable. No radiopaque foreign bodies

are identified.



IMPRESSION: Unremarkable radiographic exam of the right tibia and

fibula. Please note, tibial stress fractures may be occult on

radiographs.



Dictated by: 



  Dictated on workstation # [id number]

## 2021-08-17 NOTE — DIAGNOSTIC IMAGING REPORT
EXAMINATION: Right ankle 3 views.



HISTORY: Right tibia abnormality



COMPARISON: MRI right lower extremity 08/10/2021.



FINDINGS: 



There is no acute fracture, dislocation, or destructive osseous

process. Joint spaces are normal. The soft tissues are normal.

There are small anterior calcaneal enthesophytes.



IMPRESSION: No acute osseous abnormality of the right ankle. No

findings to correspond to the abnormality seen on prior MRI.



Dictated by: 



  Dictated on workstation # [id number]

## 2021-09-14 ENCOUNTER — HOSPITAL ENCOUNTER (OUTPATIENT)
Dept: HOSPITAL 75 - RAD FS | Age: 81
End: 2021-09-14
Attending: NURSE PRACTITIONER
Payer: COMMERCIAL

## 2021-09-14 DIAGNOSIS — S82.301D: Primary | ICD-10-CM
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
## 2021-09-14 PROCEDURE — 73610 X-RAY EXAM OF ANKLE: CPT

## 2021-09-14 PROCEDURE — 73590 X-RAY EXAM OF LOWER LEG: CPT

## 2021-09-14 NOTE — DIAGNOSTIC IMAGING REPORT
INDICATION:  Tibia fracture, follow-up



TECHNIQUE:  AP and lateral views of the right tibia and fibula  



CORRELATION STUDY:  08/17/2021



FINDINGS: 

The tibia and fibula are intact. There is no evidence for acute

fracture. Limited visualized portions of the knee and ankle are

unremarkable for acute findings but do demonstrate mild

degenerative changes. No definitive reparative change or abnormal

areas of sclerotic foci. Some generalized bony demineralization.

Soft tissues are unremarkable.



IMPRESSION: 

1.Negative for acute bony abnormality of the leg. No

reparative/healing changes.



Dictated by: 



  Dictated on workstation # [id number]

## 2021-09-14 NOTE — DIAGNOSTIC IMAGING REPORT
INDICATION: Follow-up fracture.



EXAMINATION: Right ankle 09/14/2021.



COMPARISON: 08/17/2021.



FINDINGS:



Three views of the ankle. There are no acute fractures or

dislocations. The joint spaces appear preserved. The ankle

mortise is intact. Mild osteopenia noted. There are degenerative

changes in the visualized cubocalcaneal joint space.



IMPRESSION:

1. Chronic findings with no acute osseous abnormality.



Dictated by: 



  Dictated on workstation # [id number]

## 2021-11-22 ENCOUNTER — HOSPITAL ENCOUNTER (OUTPATIENT)
Dept: HOSPITAL 75 - RAD FS | Age: 81
End: 2021-11-22
Attending: NURSE PRACTITIONER
Payer: MEDICARE

## 2021-11-22 DIAGNOSIS — M47.816: Primary | ICD-10-CM

## 2021-11-22 DIAGNOSIS — M43.8X6: ICD-10-CM

## 2021-11-22 PROCEDURE — 72100 X-RAY EXAM L-S SPINE 2/3 VWS: CPT

## 2021-11-22 NOTE — DIAGNOSTIC IMAGING REPORT
INDICATION: Lower back pain.



COMPARISON: None



FINDINGS: Frontal and lateral radiographic views of the lumbar

spine were obtained. Evaluation of static alignment shows mild

grade 1 anterolisthesis at L4-L5. There is no evidence of jumped

facets. Mild levoscoliotic deformity is also noted epicentered at

L3. There is also slight dextroscoliotic deformity epicentered at

the thoracolumbar junction. Evaluation of the vertebral body

heights demonstrates focal height loss at L5 primarily involving

the superior endplate. This results in approximately 30%

vertebral body height loss. There is no prior available for

comparison. Moderate multilevel degenerative changes are noted

and consistent with intervertebral disc height loss with multiple

facet arthropathy.



IMPRESSION:

1. Compression deformity L5 vertebral body; age-indeterminate.

Correlation with MRI is recommended.

2. Moderate multilevel degenerative changes.



Dictated by: 



  Dictated on workstation # [id number]

## 2021-12-03 ENCOUNTER — HOSPITAL ENCOUNTER (OUTPATIENT)
Dept: HOSPITAL 75 - LAB FS | Age: 81
End: 2021-12-03
Attending: INTERNAL MEDICINE
Payer: MEDICARE

## 2021-12-03 DIAGNOSIS — E78.2: Primary | ICD-10-CM

## 2021-12-03 LAB
ALBUMIN SERPL-MCNC: 4.1 GM/DL (ref 3.2–4.5)
ALP SERPL-CCNC: 93 U/L (ref 40–136)
ALT SERPL-CCNC: 15 U/L (ref 0–55)
BILIRUB SERPL-MCNC: 0.5 MG/DL (ref 0.1–1)
BUN/CREAT SERPL: 13
CALCIUM SERPL-MCNC: 9.8 MG/DL (ref 8.5–10.1)
CHLORIDE SERPL-SCNC: 106 MMOL/L (ref 98–107)
CHOLEST SERPL-MCNC: 193 MG/DL (ref ?–200)
CO2 SERPL-SCNC: 26 MMOL/L (ref 21–32)
CREAT SERPL-MCNC: 0.94 MG/DL (ref 0.6–1.3)
GFR SERPLBLD BASED ON 1.73 SQ M-ARVRAT: 57 ML/MIN
GLUCOSE SERPL-MCNC: 101 MG/DL (ref 70–105)
HDLC SERPL-MCNC: 69 MG/DL (ref 40–60)
POTASSIUM SERPL-SCNC: 4.4 MMOL/L (ref 3.6–5)
PROT SERPL-MCNC: 6.5 GM/DL (ref 6.4–8.2)
SODIUM SERPL-SCNC: 141 MMOL/L (ref 135–145)
TRIGL SERPL-MCNC: 70 MG/DL (ref ?–150)
VLDLC SERPL CALC-MCNC: 14 MG/DL (ref 5–40)

## 2021-12-03 PROCEDURE — 80053 COMPREHEN METABOLIC PANEL: CPT

## 2021-12-03 PROCEDURE — 36415 COLL VENOUS BLD VENIPUNCTURE: CPT

## 2021-12-03 PROCEDURE — 80061 LIPID PANEL: CPT

## 2021-12-10 ENCOUNTER — HOSPITAL ENCOUNTER (EMERGENCY)
Dept: HOSPITAL 75 - ER FS | Age: 81
Discharge: HOME | End: 2021-12-10
Payer: MEDICARE

## 2021-12-10 VITALS — WEIGHT: 133.38 LBS | HEIGHT: 59.84 IN | BODY MASS INDEX: 26.19 KG/M2

## 2021-12-10 VITALS — DIASTOLIC BLOOD PRESSURE: 50 MMHG | SYSTOLIC BLOOD PRESSURE: 145 MMHG

## 2021-12-10 DIAGNOSIS — G45.9: Primary | ICD-10-CM

## 2021-12-10 DIAGNOSIS — H40.9: ICD-10-CM

## 2021-12-10 DIAGNOSIS — Z79.899: ICD-10-CM

## 2021-12-10 DIAGNOSIS — Z79.82: ICD-10-CM

## 2021-12-10 DIAGNOSIS — I10: ICD-10-CM

## 2021-12-10 LAB
ALBUMIN SERPL-MCNC: 4 GM/DL (ref 3.2–4.5)
ALP SERPL-CCNC: 100 U/L (ref 40–136)
ALT SERPL-CCNC: 14 U/L (ref 0–55)
APTT BLD: 24 SEC (ref 24–35)
APTT PPP: (no result) S
BACTERIA #/AREA URNS HPF: (no result) /HPF
BASOPHILS # BLD AUTO: 0 10^3/UL (ref 0–0.1)
BASOPHILS NFR BLD AUTO: 1 % (ref 0–10)
BILIRUB SERPL-MCNC: 0.2 MG/DL (ref 0.1–1)
BILIRUB UR QL STRIP: NEGATIVE
BUN/CREAT SERPL: 23
CALCIUM SERPL-MCNC: 9.4 MG/DL (ref 8.5–10.1)
CHLORIDE SERPL-SCNC: 102 MMOL/L (ref 98–107)
CO2 SERPL-SCNC: 26 MMOL/L (ref 21–32)
CREAT SERPL-MCNC: 0.93 MG/DL (ref 0.6–1.3)
D DIMER PPP FEU-MCNC: 1.04 UG/ML (ref 0–0.49)
EOSINOPHIL # BLD AUTO: 0.1 10^3/UL (ref 0–0.3)
EOSINOPHIL NFR BLD AUTO: 2 % (ref 0–10)
FIBRINOGEN PPP-MCNC: CLEAR MG/DL
GFR SERPLBLD BASED ON 1.73 SQ M-ARVRAT: 58 ML/MIN
GLUCOSE SERPL-MCNC: 110 MG/DL (ref 70–105)
GLUCOSE UR STRIP-MCNC: NEGATIVE MG/DL
HCT VFR BLD CALC: 41 % (ref 35–52)
HGB BLD-MCNC: 13.6 G/DL (ref 11.5–16)
INR PPP: 1 (ref 0.8–1.4)
KETONES UR QL STRIP: NEGATIVE
LEUKOCYTE ESTERASE UR QL STRIP: NEGATIVE
LYMPHOCYTES # BLD AUTO: 1.6 X 10^3 (ref 1–4)
LYMPHOCYTES NFR BLD AUTO: 25 % (ref 12–44)
MANUAL DIFFERENTIAL PERFORMED BLD QL: NO
MCH RBC QN AUTO: 31 PG (ref 25–34)
MCHC RBC AUTO-ENTMCNC: 33 G/DL (ref 32–36)
MCV RBC AUTO: 92 FL (ref 80–99)
MONOCYTES # BLD AUTO: 0.5 X 10^3 (ref 0–1)
MONOCYTES NFR BLD AUTO: 7 % (ref 0–12)
NEUTROPHILS # BLD AUTO: 4.2 X 10^3 (ref 1.8–7.8)
NEUTROPHILS NFR BLD AUTO: 65 % (ref 42–75)
NITRITE UR QL STRIP: NEGATIVE
PH UR STRIP: 6 [PH] (ref 5–9)
PLATELET # BLD: 224 10^3/UL (ref 130–400)
PMV BLD AUTO: 10.7 FL (ref 9–12.2)
POTASSIUM SERPL-SCNC: 4.3 MMOL/L (ref 3.6–5)
PROT SERPL-MCNC: 6.6 GM/DL (ref 6.4–8.2)
PROT UR QL STRIP: NEGATIVE
PROTHROMBIN TIME: 13.1 SEC (ref 12.2–14.7)
RBC #/AREA URNS HPF: (no result) /HPF
SODIUM SERPL-SCNC: 137 MMOL/L (ref 135–145)
SP GR UR STRIP: 1.01 (ref 1.02–1.02)
SQUAMOUS #/AREA URNS HPF: (no result) /HPF
WBC # BLD AUTO: 6.4 10^3/UL (ref 4.3–11)
WBC #/AREA URNS HPF: (no result) /HPF

## 2021-12-10 PROCEDURE — 85025 COMPLETE CBC W/AUTO DIFF WBC: CPT

## 2021-12-10 PROCEDURE — 93005 ELECTROCARDIOGRAM TRACING: CPT

## 2021-12-10 PROCEDURE — 80053 COMPREHEN METABOLIC PANEL: CPT

## 2021-12-10 PROCEDURE — 36415 COLL VENOUS BLD VENIPUNCTURE: CPT

## 2021-12-10 PROCEDURE — 70450 CT HEAD/BRAIN W/O DYE: CPT

## 2021-12-10 PROCEDURE — 85730 THROMBOPLASTIN TIME PARTIAL: CPT

## 2021-12-10 PROCEDURE — 85379 FIBRIN DEGRADATION QUANT: CPT

## 2021-12-10 PROCEDURE — 71045 X-RAY EXAM CHEST 1 VIEW: CPT

## 2021-12-10 PROCEDURE — 81000 URINALYSIS NONAUTO W/SCOPE: CPT

## 2021-12-10 PROCEDURE — 84484 ASSAY OF TROPONIN QUANT: CPT

## 2021-12-10 PROCEDURE — 85610 PROTHROMBIN TIME: CPT

## 2021-12-10 NOTE — DIAGNOSTIC IMAGING REPORT
PROCEDURE: CT head w/o, r/o stroke.



TECHNIQUE: Multiple contiguous axial images were obtained through

the brain without the use of intravenous contrast. Auto Exposure

Controls were utilized during the CT exam to meet ALARA standards

for radiation dose reduction. 



INDICATION: Dizziness, dysphasia, altered mental status.



COMPARISON: 04/25/2020.



FINDINGS: The ventricles and cortical sulci are mildly prominent,

likely from generalized parenchymal volume loss. There are

scattered areas of hypoattenuation in the periventricular and

subcortical white matter which is likely from chronic

microvascular disease. There is no midline shift or mass effect.

No acute intracranial hemorrhage is seen. There is no CT evidence

of acute territorial ischemia. The calvarium appears intact.

Paranasal sinuses are clear.



IMPRESSION:

1. No acute intracranial hemorrhage or CT evidence of acute

territorial ischemia.

2. Generalized parenchymal volume loss with findings of chronic

microvascular disease.



Findings discussed with Memo Ronquillo DO by Dr. Trinh, on

12/10/2021 6:24 PM. 



Dictated by: 



  Dictated on workstation # [id number]

## 2021-12-10 NOTE — DIAGNOSTIC IMAGING REPORT
EXAMINATION: Chest radiograph, portable AP view.



DATE: 12/10/2021 6:16 PM



INDICATION: 81-year-old female, altered mental status.



COMPARISON: August 2, 2021.



FINDINGS: Heart size and mediastinal contours are unchanged.

There is no identified pneumothorax. There is no large pleural

effusion. There is no identified focal airspace consolidation.

The left humeral head is superiorly subluxed.



IMPRESSION: No identified acute cardiopulmonary abnormality.



Dictated by: 



  Dictated on workstation # [id number]

## 2021-12-10 NOTE — ED GENERAL
General


Stated Complaint:  AMS





History of Present Illness


Date Seen by Provider:  Dec 10, 2021


Time Seen by Provider:  17:49


Initial Comments


81-year-old female presents with some dysphagia/word finding difficulty.  Her 

 reports that it started about 20-30 minutes prior to arrival.  She was 

cooking supper and had difficulty figuring how to turn off the stove.  Patient 

is able to answer all basic questions fairly easily but if he have a 

conversation she starts having difficulty with word finding and some mild 

misspeaking.  Patient has a history of TIAs in the past.  Patient denies any 

fever, chills, nausea, vomiting, urinary symptoms, cough, shortness of breath or

any other systemic complaints.





Allergies and Home Medications


Allergies


Coded Allergies:  


     Penicillins (Unverified  Allergy, Unknown, 1/16/20)


     Statins-Hmg-Coa Reductase Inhibitor (Verified  Allergy, Unknown, 1/17/20)


   per patient report


     diphenhydramine (Verified  Allergy, Unknown, 6/26/19)





Patient Home Medication List


Home Medication List Reviewed:  Yes


Ascorbate Calcium (Vitamin C) 500 Mg Tablet, 500 MG PO DAILY, (Reported)


   Entered as Reported by: MIGUEL MIRANDA on 1/16/20 1547


Aspirin (Aspirin EC) 81 Mg Tablet.dr, 81 MG PO DAILY


   Prescribed by: VAIBHAV VELA on 1/17/20 1142


Cholecalciferol (Vitamin D3) (Vitamin D3) 2,000 Unit Tablet, 2,000 UNIT PO 

DAILY, (Reported)


   Entered as Reported by: MIGUEL MIRANDA on 1/16/20 1547


Latanoprost (Latanoprost) 2.5 Ml Drops, 1 DROP OU HS, (Reported)


   Entered as Reported by: IVAN MAX on 2/9/19 1601


Meclizine HCl (Meclizine HCl) 25 Mg Tablet, 25 MG PO TID PRN for DIZZINESS, 

(Reported)


   Entered as Reported by: MIGUEL MIRANDA on 1/16/20 1547


Metoprolol Tartrate (Metoprolol Tartrate) 25 Mg Tablet, 25 MG PO BID, (Reported)


   Entered as Reported by: IVAN MAX on 2/9/19 1601


Multivitamin (Multivitamins) 1 Each Tablet, 1 TAB PO DAILY, (Reported)


   Entered as Reported by: MIGUEL MIRANDA on 1/16/20 1547





Review of Systems


Review of Systems


Constitutional:  No chills, No fever


EENTM:  No blurred vision, No double vision


Respiratory:  No cough, No short of breath


Cardiovascular:  No chest pain, No palpitations


Gastrointestinal:  No abdominal pain, No nausea, No vomiting


Genitourinary:  No dysuria, No frequency


Musculoskeletal:  no symptoms reported


Skin:  no symptoms reported


Psychiatric/Neurological:  See HPI


Hematologic/Lymphatic:  No Symptoms Reported





Past Medical-Social-Family Hx


Seasonal Allergies


Seasonal Allergies:  No





Past Medical History


Surgery/Hospitalization HX:  


Hx Osteoporosis, Hx varicose veins


Surgeries:  Yes (HERNIA)


Tubal Ligation


Respiratory:  No


Cardiac:  Yes


Hypertension


Neurological:  Yes


TIA


GYN History:  Tubal Ligation


Genitourinary:  No


Gastrointestinal:  No


Musculoskeletal:  No


Endocrine:  No


HEENT:  No


Glaucoma


Cancer:  No


Psychosocial:  No


Integumentary:  No


Blood Disorders:  No


Adverse Reaction/Blood Tranf:  No





Family Medical History


No Pertinent Family Hx





Physical Exam


Vital Signs





Vital Signs - First Documented








 12/10/21 12/10/21





 18:04 18:47


 


Temp 36.3 


 


Pulse 84 


 


Resp 15 


 


B/P (MAP) 165/74 (104) 


 


Pulse Ox  98


 


O2 Delivery Room Air 





Capillary Refill :


Height, Weight, BMI


Height: 5'2.00"


Weight: 142lbs. oz. 64.992041hy; 25.00 BMI


Method:Stated


General Appearance:  No Apparent Distress, WD/WN


HEENT:  PERRL/EOMI


Neck:  Normal Inspection, Non Tender


Respiratory:  Lungs Clear, Normal Breath Sounds


Cardiovascular:  Regular Rate, Rhythm, No Edema


Gastrointestinal:  Non Tender, Soft


Extremity:  Normal Capillary Refill, Normal Inspection, Normal Range of Motion


Neurologic/Psychiatric:  Oriented x3, No Motor/Sensory Deficits, CNs II-XII Norm

as Tested, Other (Patient with mild dysphagia and word finding but no other 

obvious acute deficits)





Progress/Results/Core Measures


Suspected Sepsis


SIRS


Temperature: 


Pulse:  


Respiratory Rate: 


 


Laboratory Tests


12/10/21 18:00: White Blood Count 6.4


Blood Pressure  / 


Mean: 


 





Laboratory Tests


12/10/21 18:00: 


Creatinine 0.93, INR Comment 1.0, Platelet Count 224, Total Bilirubin 0.2








Results/Orders


Lab Results





Laboratory Tests








Test


 12/10/21


17:48 12/10/21


18:00 Range/Units


 


 


Urine Color


 [id number]


 


  





 


Urine Clarity CLEAR    


 


Urine pH 6.0   5-9  


 


Urine Specific Gravity 1.015 L  1.016-1.022  


 


Urine Protein NEGATIVE   NEGATIVE  


 


Urine Glucose (UA) NEGATIVE   NEGATIVE  


 


Urine Ketones NEGATIVE   NEGATIVE  


 


Urine Nitrite NEGATIVE   NEGATIVE  


 


Urine Bilirubin NEGATIVE   NEGATIVE  


 


Urine Urobilinogen 0.2   < = 1.0  MG/DL


 


Urine Leukocyte Esterase NEGATIVE   NEGATIVE  


 


Urine RBC (Auto) NEGATIVE   NEGATIVE  


 


Urine RBC NONE    /HPF


 


Urine WBC NONE    /HPF


 


Urine Squamous Epithelial


Cells 2-5 


 


  /HPF





 


Urine Crystals NONE    /LPF


 


Urine Bacteria TRACE    /HPF


 


Urine Casts NONE    /LPF


 


Urine Mucus SMALL H   /LPF


 


Urine Culture Indicated NO    


 


White Blood Count


 


 6.4 


 4.3-11.0


10^3/uL


 


Red Blood Count


 


 4.44 


 3.80-5.11


10^6/uL


 


Hemoglobin  13.6  11.5-16.0  g/dL


 


Hematocrit  41  35-52  %


 


Mean Corpuscular Volume  92  80-99  fL


 


Mean Corpuscular Hemoglobin  31  25-34  pg


 


Mean Corpuscular Hemoglobin


Concent 


 33 


 32-36  g/dL





 


Red Cell Distribution Width  13.0  10.0-14.5  %


 


Platelet Count


 


 224 


 130-400


10^3/uL


 


Mean Platelet Volume  10.7  9.0-12.2  fL


 


Immature Granulocyte % (Auto)  0   %


 


Neutrophils (%) (Auto)  65  42-75  %


 


Lymphocytes (%) (Auto)  25  12-44  %


 


Monocytes (%) (Auto)  7  0-12  %


 


Eosinophils (%) (Auto)  2  0-10  %


 


Basophils (%) (Auto)  1  0-10  %


 


Neutrophils # (Auto)  4.2  1.8-7.8  X 10^3


 


Lymphocytes # (Auto)  1.6  1.0-4.0  X 10^3


 


Monocytes # (Auto)  0.5  0.0-1.0  X 10^3


 


Eosinophils # (Auto)


 


 0.1 


 0.0-0.3


10^3/uL


 


Basophils # (Auto)


 


 0.0 


 0.0-0.1


10^3/uL


 


Immature Granulocyte # (Auto)


 


 0.0 


 0.0-0.1


10^3/uL


 


Prothrombin Time  13.1  12.2-14.7  SEC


 


INR Comment  1.0  0.8-1.4  


 


Activated Partial


Thromboplast Time 


 24 


 24-35  SEC





 


D-Dimer


 


 1.04 H


 0.00-0.49


UG/ML


 


Sodium Level  137  135-145  MMOL/L


 


Potassium Level  4.3  3.6-5.0  MMOL/L


 


Chloride Level  102    MMOL/L


 


Carbon Dioxide Level  26  21-32  MMOL/L


 


Anion Gap  9  5-14  MMOL/L


 


Blood Urea Nitrogen  21 H 7-18  MG/DL


 


Creatinine


 


 0.93 


 0.60-1.30


MG/DL


 


Estimat Glomerular Filtration


Rate 


 58 


  





 


BUN/Creatinine Ratio  23   


 


Glucose Level  110 H   MG/DL


 


Calcium Level  9.4  8.5-10.1  MG/DL


 


Corrected Calcium  9.4  8.5-10.1  MG/DL


 


Total Bilirubin  0.2  0.1-1.0  MG/DL


 


Aspartate Amino Transf


(AST/SGOT) 


 19 


 5-34  U/L





 


Alanine Aminotransferase


(ALT/SGPT) 


 14 


 0-55  U/L





 


Alkaline Phosphatase  100    U/L


 


Troponin I  < 0.30  <0.30  NG/ML


 


Total Protein  6.6  6.4-8.2  GM/DL


 


Albumin  4.0  3.2-4.5  GM/DL








My Orders





Orders - SALVADOR,CARMITA L DO


Cbc With Automated Diff (12/10/21 17:57)


Protime With Inr (12/10/21 17:57)


Partial Thromboplastin Time (12/10/21 17:57)


Comprehensive Metabolic Panel (12/10/21 17:57)


Fibrin Degradation Products (12/10/21 17:57)


Troponin I Fs (12/10/21 17:57)


Ua Culture If Indicated (12/10/21 17:57)


Chest 1 View Ap/Pa Only (12/10/21 17:57)


Ekg Tracing (12/10/21 17:57)


Accucheck Stat ONCE (12/10/21 17:57)


Ed Iv/Invasive Line Start (12/10/21 17:57)


Vital Signs Stroke Patient Q15M (12/10/21 17:57)


Ct Head Wo-R/O Stroke (12/10/21 17:57)


Intake & Output 06,14,22 (12/10/21 17:57)


Monitor-Rhythm Ecg Trace Only (12/10/21 17:57)


Dysphagia Screening Tool (12/10/21 17:57)





Vital Signs/I&O











 12/10/21 12/10/21





 18:04 18:47


 


Temp 36.3 


 


Pulse 84 70


 


Resp 15 16


 


B/P (MAP) 165/74 (104) 145/50


 


Pulse Ox  98


 


O2 Delivery Room Air 





Capillary Refill :


Progress Note :  


Progress Note


Patient symptoms have completely resolved throughout her stay.  They are very 

minimal to begin with.  Patient likely with a TIA.  Patient reports that she 

fell he had 6 or 8 of them at least for the past few years.  Patient was offered

admission versus outpatient follow-up but she would prefer to just follow-up 

with Dr. Davila.  She reports she has an appointment with him next week already.  

She will return to the ER as needed.  Patient stable and discharged home





ECG


Initial ECG Impression Date:  Dec 10, 2021


Initial ECG Impression Time:  18:20


Initial ECG Rate:  68


Initial ECG Rhythm:  Normal Sinus


Initial ECG Intervals:  Normal


Initial ECG Impression:  Normal





Departure


Impression





   Primary Impression:  


   TIA (transient ischemic attack)


Disposition:  01 HOME, SELF-CARE


Condition:  Stable





Departure-Patient Inst.


Referrals:  


REBEKA DAVILA MD (PCP/Family)


Primary Care Physician


Patient Instructions:  Transient Ischemic Attack ED





Add. Discharge Instructions:  


Please keep your appointment with Dr. Davila next week


Return to the ER as needed











CARMITA SALVADOR DO               Dec 10, 2021 17:49

## 2021-12-13 ENCOUNTER — HOSPITAL ENCOUNTER (OUTPATIENT)
Dept: HOSPITAL 75 - CARD | Age: 81
End: 2021-12-13
Attending: INTERNAL MEDICINE
Payer: MEDICARE

## 2021-12-13 DIAGNOSIS — I25.10: ICD-10-CM

## 2021-12-13 DIAGNOSIS — I36.1: ICD-10-CM

## 2021-12-13 DIAGNOSIS — I11.9: Primary | ICD-10-CM

## 2021-12-13 PROCEDURE — 93306 TTE W/DOPPLER COMPLETE: CPT

## 2022-02-16 ENCOUNTER — HOSPITAL ENCOUNTER (OUTPATIENT)
Dept: HOSPITAL 75 - CARD | Age: 82
End: 2022-02-16
Attending: INTERNAL MEDICINE
Payer: MEDICARE

## 2022-02-16 VITALS — DIASTOLIC BLOOD PRESSURE: 89 MMHG | SYSTOLIC BLOOD PRESSURE: 210 MMHG

## 2022-02-16 DIAGNOSIS — I10: Primary | ICD-10-CM

## 2022-02-16 DIAGNOSIS — I25.10: ICD-10-CM

## 2022-02-16 PROCEDURE — 93017 CV STRESS TEST TRACING ONLY: CPT

## 2022-02-16 PROCEDURE — 78452 HT MUSCLE IMAGE SPECT MULT: CPT

## 2022-02-16 NOTE — CARDIOLOGY STRESS TEST REPORT
Stress Test Report


Date of Procedure/Referring:


Date of Procedure:  Feb 16, 2022


PCP


Dilan Wong MD


Admitting Physician


Nahid Davila MD





Indications:


HTN





Baseline Heart Rate:


78





Baseline Blood Pressure:


Blood Pressure Systolic:  210


Blood Pressure Diastolic:  89


Baseline Vitals





Vital Signs








  Date Time  Temp Pulse Resp B/P (MAP) Pulse Ox O2 Delivery O2 Flow Rate FiO2


 


2/16/22 09:02  74 19 210/89 (129) 97 Room Air  











Baseline EKG:


Baseline EKG:  NSR





Summary


After explaining the procedure to the patient, she  signed a consent and then 

brought to the stress nuclear laboratory.


Patient received 0.4 mg Lexiscan for stress test, ECG, heart rate and blood 

pressure were monitored continuously.  Resting and stress dose of radio tracer w

ere injected, imaging was acquired and reviewed in short axis, horizontal long 

axis and vertical long axis views.


TID:  1.17


SSS:  0


SDS:  0


EF:  76


1.  Patient tolerated Lexiscan well


2.  Baseline hypertension persisted during test with blood pressure 210/89


3.  Minimal nondiagnostic EKG changes with Lexiscan injection


4.  No significant ischemia or infarction on SPECT images


5.  Normal left ventricular size, EF 76%











DILAN WONG MD              Feb 16, 2022 11:28

## 2022-02-28 ENCOUNTER — HOSPITAL ENCOUNTER (OUTPATIENT)
Dept: HOSPITAL 75 - RAD FS | Age: 82
End: 2022-02-28
Attending: NURSE PRACTITIONER
Payer: MEDICARE

## 2022-02-28 DIAGNOSIS — M16.0: Primary | ICD-10-CM

## 2022-02-28 PROCEDURE — 72170 X-RAY EXAM OF PELVIS: CPT

## 2022-02-28 PROCEDURE — 73590 X-RAY EXAM OF LOWER LEG: CPT

## 2022-02-28 PROCEDURE — 73502 X-RAY EXAM HIP UNI 2-3 VIEWS: CPT

## 2022-02-28 NOTE — DIAGNOSTIC IMAGING REPORT
INDICATION: Right leg pain.



TIME OF EXAM: 10:34 AM



2 views of right tibia and fibula were obtained. Alignment at the

knee and ankle is normal. The tibia and fibula appear intact. No

fractures are seen.



IMPRESSION: No acute abnormalities detected.



Dictated by: 



  Dictated on workstation # [id number]

## 2022-02-28 NOTE — DIAGNOSTIC IMAGING REPORT
INDICATION: Pain in the right hip.



TIME OF EXAM: 10:32 AM



2 views of the right hip were obtained. Femoral acetabular

alignment is normal. There is some spurring along the

superior-lateral aspect of the acetabulum. The superior and

medial joint space is narrowed. There is also spurring at the

femoral head neck junction. Features are consistent with

osteoarthritis of the right hip. The right femoral head and neck

are intact. There are no fractures identified. The right-sided

rami are intact.



IMPRESSION: There are osteoarthritic changes to the right hip. No

acute bony abnormality is detected.



Dictated by: 



  Dictated on workstation # [id number]

## 2022-02-28 NOTE — DIAGNOSTIC IMAGING REPORT
INDICATION: Right hip pain.



TIME OF EXAM: 10:32 AM



Single AP view of the pelvis was obtained. Femoral acetabular

alignment is normal bilaterally. There is significant joint space

narrowing on the right side as well as marginal osteophyte

formation consistent with osteophytic changes. The left hip joint

space is fairly well-maintained. There is some mild degenerative

spurring of the left hip. Both femoral necks are intact. The rami

are intact. The SI joints and symphysis are not widened. No

fractures are seen.



IMPRESSION: Degenerative changes bilaterally but much more severe

involving the right hip. No acute bony abnormality is identified.



Dictated by: 



  Dictated on workstation # [id number]

## 2022-12-20 ENCOUNTER — HOSPITAL ENCOUNTER (OUTPATIENT)
Dept: HOSPITAL 75 - RAD FS | Age: 82
End: 2022-12-20
Attending: FAMILY MEDICINE
Payer: MEDICARE

## 2022-12-20 DIAGNOSIS — M16.0: Primary | ICD-10-CM

## 2022-12-20 PROCEDURE — 73502 X-RAY EXAM HIP UNI 2-3 VIEWS: CPT

## 2023-01-02 ENCOUNTER — HOSPITAL ENCOUNTER (EMERGENCY)
Dept: HOSPITAL 75 - ER FS | Age: 83
Discharge: HOME | End: 2023-01-02
Payer: MEDICARE

## 2023-01-02 VITALS — WEIGHT: 123.46 LBS | BODY MASS INDEX: 22.72 KG/M2 | HEIGHT: 61.81 IN

## 2023-01-02 VITALS — DIASTOLIC BLOOD PRESSURE: 95 MMHG | SYSTOLIC BLOOD PRESSURE: 165 MMHG

## 2023-01-02 DIAGNOSIS — M17.9: Primary | ICD-10-CM

## 2023-01-02 DIAGNOSIS — Z28.310: ICD-10-CM

## 2023-01-02 PROCEDURE — 99283 EMERGENCY DEPT VISIT LOW MDM: CPT

## 2023-01-02 NOTE — ED LOWER EXTREMITY
General


Chief Complaint:  Lower Extremity


Stated Complaint:  RT KNEE PAIN


Nursing Triage Note:  


PT REPORTS BILATERAL KNEE PAIN BUT THE RIGHT KNEE STARTED HURTING MORE THAN 


USUAL YESTERDAY.


Source:  patient


Exam Limitations:  no limitations





History of Present Illness


Date Seen by Provider:  Jan 2, 2023


Time Seen by Provider:  09:50


Initial Comments


Patient is a 82-year-old female with history of osteoarthritis who presents with

right anterior and lateral knee pain worse with ambulation and weightbearing and

increased joint swelling.  Symptoms been going on for the past 2 days.  Patient 

denies falls or injuries.  She denies calf pain, chest pain palpitation 

shortness of breath.  She is not on anticoagulation therapy.  She does not take 

NSAIDs routinely due to her peptic ulcers.  She does on occasion take turmeric. 

No other symptoms or complaints


Onset:  yesterday


Pain/Injury Location:  right knee


Method of Injury:  other


Modifying Factors:  Improves With Other





Allergies and Home Medications


Allergies


Coded Allergies:  


     Penicillins (Unverified  Allergy, Unknown, 1/16/20)


     Statins-HMG-CoA Reductase Inhibitor (Verified  Allergy, Unknown, 1/17/20)


   per patient report


     diphenhydramine (Verified  Allergy, Unknown, 6/26/19)





Patient Home Medication List


Home Medication List Reviewed:  Yes


Ascorbate Calcium (Vitamin C) 500 Mg Tablet, 500 MG PO DAILY, (Reported)


   Entered as Reported by: MIGUEL MIRANDA on 1/16/20 1547


Aspirin (Aspirin EC) 81 Mg Tablet.dr, 81 MG PO DAILY


   Prescribed by: VAIBHAV VELA on 1/17/20 1142


Cholecalciferol (Vitamin D3) (Vitamin D3) 2,000 Unit Tablet, 2,000 UNIT PO 

DAILY, (Reported)


   Entered as Reported by: MIGUEL MRIANDA on 1/16/20 1547


Latanoprost (Latanoprost) 2.5 Ml Drops, 1 DROP OU HS, (Reported)


   Entered as Reported by: IVAN MAX on 2/9/19 1601


Meclizine HCl (Meclizine HCl) 25 Mg Tablet, 25 MG PO TID PRN for DIZZINESS, 

(Reported)


   Entered as Reported by: MIGUEL MIRANDA on 1/16/20 1547


Metoprolol Tartrate (Metoprolol Tartrate) 25 Mg Tablet, 25 MG PO BID, (Reported)


   Entered as Reported by: IVAN MAX on 2/9/19 1601


Multivitamin (Multivitamins) 1 Each Tablet, 1 TAB PO DAILY, (Reported)


   Entered as Reported by: MIGUEL MIRANDA on 1/16/20 1547





Review of Systems


Constitutional:  see HPI


EENTM:  see HPI


Respiratory:  see HPI


Cardiovascular:  see HPI


Gastrointestinal:  see HPI


Genitourinary:  see HPI


Musculoskeletal:  see HPI


Skin:  see HPI


Psychiatric/Neurological:  See HPI





All Other Systems Reviewed


Negative Unless Noted:  No





Past Medical-Social-Family Hx


Patient Social History


Tobacco Use?:  No


Use of E-Cig and/or Vaping dev:  No


Substance use?:  No


Alcohol Use?:  No


Pt feels they are or have been:  No





Seasonal Allergies


Seasonal Allergies:  No





Past Medical History


Surgery/Hospitalization HX:  


Hx Osteoporosis, Hx varicose veins


Surgeries:  Yes (HERNIA)


Tubal Ligation


Respiratory:  No


Cardiac:  Yes


Hypertension


Neurological:  Yes


TIA


GYN History:  Tubal Ligation


Genitourinary:  No


Gastrointestinal:  No


Musculoskeletal:  No


Endocrine:  No


HEENT:  No


Glaucoma


Cancer:  No


Psychosocial:  No


Integumentary:  No


Blood Disorders:  No


Adverse Reaction/Blood Tranf:  No





Family Medical History


No Pertinent Family Hx





Physical Exam


Vital Signs





Vital Signs - First Documented








 1/2/23





 09:57


 


Temp 36.3


 


Pulse 76


 


Resp 16


 


B/P (MAP) 165/95 (118)


 


Pulse Ox 96


 


O2 Delivery Room Air





Capillary Refill : Less Than 3 Seconds


Height, Weight, BMI


Height: 5'2.00"


Weight: 142lbs. oz. 64.859486we; 22.00 BMI


Method:Stated


General Appearance:  WD/WN, no apparent distress


Knees:  right knee normal range of motion, right knee no evidence of injury, 

right knee joint effusion, right knee pain


Neurologic/Psychiatric:  no motor/sensory deficits





Progress/Results/Core Measures


Results/Orders


My Orders





Orders - LINDSAY CAMARENA DO


Tramadol Tablet (Ultram Tablet) (1/2/23 10:00)





Vital Signs/I&O











 1/2/23





 09:57


 


Temp 36.3


 


Pulse 76


 


Resp 16


 


B/P (MAP) 165/95 (118)


 


Pulse Ox 96


 


O2 Delivery Room Air














Blood Pressure Mean:                    118











Departure


Communication (Admissions)


Right knee joint effusion with history of osteoarthritis.  Patient's the leg 

does not feel warm, tight there is no overlying cellulitis.  Tramadol given.  

Imaging studies not indicated at this time.  Will place patient on NSAIDs with 

antiacid and continue to treat pain with PCP follow-up for further management.  

Return precautions reviewed.  Patient verbalizes understanding agreement 

discharge instructions prior to departure.





Impression





   Primary Impression:  


   Effusion of knee joint right


   Additional Impression:  


   Osteoarthritis


Disposition:  01 HOME, SELF-CARE


Condition:  Stable





Departure-Patient Inst.


Decision time for Depature:  10:06


Referrals:  


REBEKA CEDILLO MD (PCP)


Primary Care Physician


Patient Instructions:  Osteoarthritis





Add. Discharge Instructions:  


Please take Pepcid twice daily resume turmeric, take naproxen or ibuprofen for 

the next 3 days and tramadol as needed for additional relief.  Follow-up with 

your PCP for additional management and consideration of orthopedic referral.  

Return precautions reviewed.  Patient verbalizes understanding and agreement 

with discharge instructions prior to departure.





All discharge instructions reviewed with patient and/or family. Voiced 

understanding.


Scripts


Tramadol HCl (Tramadol HCl) 50 Mg Tablet


50 MG PO Q6H PRN for PAIN for 3 Days, #12 TAB 0 Refills


   Prov: LINDSAY CAMARENA DO         1/2/23 


Famotidine (Pepcid) 20 Mg Tablet


20 MG PO BID, #60 TAB


   Prov: LINDSAY CAMARENA DO         1/2/23











LINDSAY CAMARENA DO                    Jan 2, 2023 10:07

## 2023-11-26 ENCOUNTER — HOSPITAL ENCOUNTER (EMERGENCY)
Dept: HOSPITAL 75 - ER FS | Age: 83
Discharge: HOME | End: 2023-11-26
Payer: MEDICARE

## 2023-11-26 VITALS — DIASTOLIC BLOOD PRESSURE: 70 MMHG | SYSTOLIC BLOOD PRESSURE: 163 MMHG

## 2023-11-26 VITALS — HEIGHT: 62.01 IN | WEIGHT: 118.39 LBS | BODY MASS INDEX: 21.51 KG/M2

## 2023-11-26 DIAGNOSIS — R42: Primary | ICD-10-CM

## 2023-11-26 PROCEDURE — 93005 ELECTROCARDIOGRAM TRACING: CPT

## 2023-11-26 PROCEDURE — 82947 ASSAY GLUCOSE BLOOD QUANT: CPT

## 2023-11-26 NOTE — ED NEUROLOGICAL PROBLEM
General


Stated Complaint:  DIZZINESS


Source:  patient, old records


Exam Limitations:  no limitations





History of Present Illness


Date Seen by Provider:  Nov 26, 2023


Time Seen by Provider:  18:50


Initial Comments


83-year-old female with past medical history of vertigo coming in due to 

dizziness.  Started roughly 30 minutes prior to arrival with the room spinning 

somewhat.  She took a meclizine and is feeling better.  She just wanted to be 

checked out, although she states she is at her baseline.  She has been dealing 

with vertigo for many years.  Denies any chest pain, shortness of breath, 

abdominal pain, nausea, vomiting, diarrhea, weakness, numbness, current vision 

changes, voice changes, fever, chills, or any other concerns.





Of note, her doctor took her off her metoprolol roughly 3 months ago due to low 

blood pressures.





Allergies and Home Medications


Allergies


Coded Allergies:  


     Penicillins (Unverified  Allergy, Unknown, 1/16/20)


     Statins-HMG-CoA Reductase Inhibitor (Verified  Allergy, Unknown, 1/17/20)


   per patient report


     diphenhydramine (Verified  Allergy, Unknown, 6/26/19)





Patient Home Medication List


Home Medication List Reviewed:  Yes


Ascorbate Calcium (Vitamin C) 500 Mg Tablet, 500 MG PO DAILY, (Reported)


   Entered as Reported by: MIGUEL MIRANDA on 1/16/20 1547


Aspirin (Aspirin EC) 81 Mg Tablet.dr, 81 MG PO DAILY


   Prescribed by: VAIBHAV VELA on 1/17/20 1142


Cholecalciferol (Vitamin D3) (Vitamin D3) 2,000 Unit Tablet, 2,000 UNIT PO 

DAILY, (Reported)


   Entered as Reported by: MIGUEL MIRANDA on 1/16/20 1547


Famotidine (Pepcid) 20 Mg Tablet, 20 MG PO BID


   Prescribed by: LINDSAY CAMARENA on 1/2/23 1008


Latanoprost (Latanoprost) 2.5 Ml Drops, 1 DROP OU HS, (Reported)


   Entered as Reported by: IVAN MAX on 2/9/19 1601


Meclizine HCl (Meclizine HCl) 25 Mg Tablet, 25 MG PO TID PRN for DIZZINESS, 

(Reported)


   Entered as Reported by: MIGUEL MIRANDA on 1/16/20 1547


Metoprolol Tartrate (Metoprolol Tartrate) 25 Mg Tablet, 25 MG PO BID, (Reported)


   Entered as Reported by: IVAN MAX on 2/9/19 1601


Multivitamin (Multivitamins) 1 Each Tablet, 1 TAB PO DAILY, (Reported)


   Entered as Reported by: MIGUEL MIRANDA on 1/16/20 1547


Tramadol HCl (Tramadol HCl) 50 Mg Tablet, 50 MG PO Q6H PRN for PAIN


   Prescribed by: LINDSAY CAMARENA on 1/2/23 1009





Review of Systems


Review of Systems


Constitutional:  No fever


Eyes:  No Symptoms Reported


Ears, Nose, Mouth, Throat:  no symptoms reported


Respiratory:  no symptoms reported


Cardiovascular:  no symptoms reported


Gastrointestinal:  no symptoms reported


Genitourinary:  no symptoms reported


Musculoskeletal:  no symptoms reported


Skin:  no symptoms reported


Psychiatric/Neurological:  See HPI





Past Medical-Social-Family Hx


Patient Social History


Tobacco Use?:  No





Seasonal Allergies


Seasonal Allergies:  No





Past Medical History


Surgery/Hospitalization HX:  


Hx Osteoporosis, Hx varicose veins, Hypertension, TIAs, vertigo


Surgeries:  Yes (HERNIA)


Tubal Ligation


Respiratory:  No


Cardiac:  Yes


Hypertension


Neurological:  Yes


TIA


GYN History:  Tubal Ligation


Genitourinary:  No


Gastrointestinal:  No


Musculoskeletal:  No


Endocrine:  No


HEENT:  No


Glaucoma


Cancer:  No


Psychosocial:  No


Integumentary:  No


Blood Disorders:  No


Adverse Reaction/Blood Tranf:  No





Family Medical History


No Pertinent Family Hx





Physical Exam


Vital Signs





Vital Signs - First Documented








 11/26/23





 18:51


 


Temp 36.6


 


Pulse 93


 


Resp 12


 


B/P (MAP) 190/75 (113)


 


O2 Delivery Room Air





Capillary Refill :


Height, Weight, BMI


Height: 5'2.00"


Weight: 142lbs. oz. 64.209978mc; 22.00 BMI


Method:Stated


General Appearance:  WD/WN, no apparent distress


HEENT:  PERRL/EOMI, normal ENT inspection, TMs normal, pharynx normal


Neck:  non-tender, full range of motion, supple, normal inspection


Respiratory:  chest non-tender, lungs clear, normal breath sounds, no 

respiratory distress, no accessory muscle use


Cardiovascular:  regular rate, rhythm, no edema


Gastrointestinal:  normal bowel sounds, non tender, soft; No distended, No 

guarding, No rebound


Back:  normal inspection, no CVA tenderness


Extremities:  normal range of motion, non-tender, normal inspection, no pedal 

edema, no calf tenderness, normal capillary refill


Neurologic/Psychiatric:  CNs II-XII nml as tested, no motor/sensory deficits, al

ert, normal mood/affect, oriented x 3


Crainal Nerves:  normal hearing, normal speech, PERRL


Coordination/Gait:  normal finger to nose, normal gait


Motor/Sensory:  no motor deficit, no sensory deficit, no pronator drift


Skin:  normal color, warm/dry





Stroke


Onset of Symptoms


Date of Onset of Symptoms:  Nov 26, 2023


Time of Symptom Onset:  18:00


Onset of Symptoms:  Yes





NIH Stroke Scale Assessment





   Select: Initial Level of Consciousness: 0=Alert (0), Level of Consciousness-

   Questions: 0=Answers both month/age (0), LOC Commands: 0=Performs both tasks 

   (0), Gaze: Normal (0), Visual Fields: 0=No visual loss (0), Facial Movement 

   (Facial Paresis): 0=Normal symmetrical mnt (0), Motor Function-Arms Right: 

   0=No drift (0), Motor Function-Arms Left: 0=No drift (0), Motor Function-Legs

   Right: 0=No drift (0), Motor Function-Legs Left: 0=No drift (0), Limb Ataxia:

   0=Absent (0), Sensory: 0=Normal:no loss (0), Best Language: 0=No aphasia (0),

   Dysarthria: 0=Normal (0), Extinction & Inattention: 0=No abnormality (0), 

   Total: 0





Stroke Thrombolytic Exclusion


Age 18 or Over:  Yes


Acute intenal hemorrhage:  No


History of CVA:  No


Uncontrolled Coagulation Defec:  No


Intracranial Hemorrhage:  No


Severe Hypertension:  No


GI or  Bleed:  No


Subarachnoid Hemorrhage:  No


Intracranial Neoplasm/Aneurysm:  No


Oral Anticoagulants:  No


Surgery or Trauma:  No


Puncture of Non-Compressible V:  No


Recent CPR:  No


Diabetic Hemorrhagic Retinopat:  No


Organ Biopsy:  No


Recent Obstetric Delivery:  No


Significant Hepatic Dysfunctio:  No


NIH Stoke Scale >22:  No


Bacterial Endocarditis:  No


Pericarditis:  No


Improving Symptoms:  Yes


Platelets:  No





IV - TPa Received


IV - TPa Procedure Performed?:  No





Progress/Results/Core Measures


Results/Orders


Lab Results





Laboratory Tests








Test


 11/26/23


19:05 Range/Units


 


 


Glucometer 91    MG/DL








My Orders





Orders - ABDULLAHI FREEMAN MD


Metoprolol Succinate (Xl) Tab (Metoprolo (11/26/23 19:15)


Accucheck Stat ONCE (11/26/23 19:03)


Ekg Tracing (11/26/23 19:03)





Vital Signs/I&O











 11/26/23





 18:51


 


Temp 36.6


 


Pulse 93


 


Resp 12


 


B/P (MAP) 190/75 (113)


 


O2 Delivery Room Air











Progress


Progress Note :  


Progress Note


83-year-old female presenting for dizziness.  ABCs were intact and vitals are 

stable on presentation although she is hypertensive.  She recently was taken off

her metoprolol.  NIH stroke scale is 0 and a comprehensive neuro exam is 

completely normal.  She states she is back to her baseline.  She was seeing 

improvement after she took her meclizine and has a history of vertigo.  

Considered a CT head, but given this clinical situation is very similar to prior

episodes with negative workups in the past, and given she is normal with a 

normal neuroexam now, I do not think it would be needed.  EKG ordered and 

interpreted by me showing sinus rhythm with no acute ischemic changes.  Glucose 

in the 90s which is appropriate.  She is not showing any signs of stroke at this

time.  Given she is at her baseline, I believe she stable for discharge with o

utpatient follow up. She was sent home with strict return precautions.


Initial ECG Impression Date:  Nov 26, 2023


Initial ECG Impression Time:  19:12


Initial ECG Rate:  78


Initial ECG Rhythm:  Normal Sinus


Comment


Narrow QRS, normal axis, no significant ST changes or T wave abnormalities





Departure


Impression





   Primary Impression:  


   Vertigo


Disposition:  01 HOME, SELF-CARE


Condition:  Stable





Departure-Patient Inst.


Decision time for Depature:  19:25


Referrals:  


REBEKA CEDILLO MD (PCP)


Primary Care Physician


Patient Instructions:  Dizziness, Adult ED





Add. Discharge Instructions:  


This is most likely vertigo which is not related to a TIA.  Be sure to drink 

plenty of fluids and follow back up with your regular doctor.  Your blood 

pressure was elevated here, it is possible this is related to your symptoms as 

well.  We did give you some metoprolol here.











ABDULLAHI FREEMAN MD          Nov 26, 2023 19:07